# Patient Record
Sex: MALE | Race: WHITE | NOT HISPANIC OR LATINO | URBAN - METROPOLITAN AREA
[De-identification: names, ages, dates, MRNs, and addresses within clinical notes are randomized per-mention and may not be internally consistent; named-entity substitution may affect disease eponyms.]

---

## 2020-04-04 ENCOUNTER — EMERGENCY (EMERGENCY)
Facility: HOSPITAL | Age: 85
LOS: 1 days | Discharge: HOME | End: 2020-04-04
Attending: EMERGENCY MEDICINE
Payer: MEDICARE

## 2020-04-04 VITALS
RESPIRATION RATE: 18 BRPM | OXYGEN SATURATION: 99 % | SYSTOLIC BLOOD PRESSURE: 142 MMHG | HEART RATE: 57 BPM | DIASTOLIC BLOOD PRESSURE: 83 MMHG

## 2020-04-04 VITALS
HEIGHT: 62 IN | WEIGHT: 139.99 LBS | SYSTOLIC BLOOD PRESSURE: 161 MMHG | RESPIRATION RATE: 20 BRPM | OXYGEN SATURATION: 97 % | TEMPERATURE: 100 F | DIASTOLIC BLOOD PRESSURE: 77 MMHG | HEART RATE: 70 BPM

## 2020-04-04 LAB
ALBUMIN SERPL ELPH-MCNC: 3.9 G/DL — SIGNIFICANT CHANGE UP (ref 3.3–5)
ALP SERPL-CCNC: 124 U/L — HIGH (ref 40–120)
ALT FLD-CCNC: 39 U/L — SIGNIFICANT CHANGE UP (ref 10–45)
ANION GAP SERPL CALC-SCNC: 13 MMOL/L — SIGNIFICANT CHANGE UP (ref 5–17)
APPEARANCE UR: ABNORMAL
APTT BLD: 30.5 SEC — SIGNIFICANT CHANGE UP (ref 27.5–36.3)
AST SERPL-CCNC: 39 U/L — SIGNIFICANT CHANGE UP (ref 10–40)
BACTERIA # UR AUTO: ABNORMAL
BASOPHILS # BLD AUTO: 0.05 K/UL — SIGNIFICANT CHANGE UP (ref 0–0.2)
BASOPHILS NFR BLD AUTO: 0.5 % — SIGNIFICANT CHANGE UP (ref 0–2)
BILIRUB SERPL-MCNC: 0.9 MG/DL — SIGNIFICANT CHANGE UP (ref 0.2–1.2)
BILIRUB UR-MCNC: NEGATIVE — SIGNIFICANT CHANGE UP
BUN SERPL-MCNC: 22 MG/DL — SIGNIFICANT CHANGE UP (ref 7–23)
CALCIUM SERPL-MCNC: 9.5 MG/DL — SIGNIFICANT CHANGE UP (ref 8.4–10.5)
CHLORIDE SERPL-SCNC: 103 MMOL/L — SIGNIFICANT CHANGE UP (ref 96–108)
CO2 SERPL-SCNC: 23 MMOL/L — SIGNIFICANT CHANGE UP (ref 22–31)
COLOR SPEC: YELLOW — SIGNIFICANT CHANGE UP
CREAT SERPL-MCNC: 1.15 MG/DL — SIGNIFICANT CHANGE UP (ref 0.5–1.3)
DIFF PNL FLD: ABNORMAL
EOSINOPHIL # BLD AUTO: 0.09 K/UL — SIGNIFICANT CHANGE UP (ref 0–0.5)
EOSINOPHIL NFR BLD AUTO: 1 % — SIGNIFICANT CHANGE UP (ref 0–6)
EPI CELLS # UR: 1 /HPF — SIGNIFICANT CHANGE UP
GLUCOSE SERPL-MCNC: 99 MG/DL — SIGNIFICANT CHANGE UP (ref 70–99)
GLUCOSE UR QL: NEGATIVE — SIGNIFICANT CHANGE UP
HCT VFR BLD CALC: 45.7 % — SIGNIFICANT CHANGE UP (ref 39–50)
HGB BLD-MCNC: 14.3 G/DL — SIGNIFICANT CHANGE UP (ref 13–17)
HYALINE CASTS # UR AUTO: 1 /LPF — SIGNIFICANT CHANGE UP (ref 0–2)
IMM GRANULOCYTES NFR BLD AUTO: 0.5 % — SIGNIFICANT CHANGE UP (ref 0–1.5)
INR BLD: 1.12 RATIO — SIGNIFICANT CHANGE UP (ref 0.88–1.16)
KETONES UR-MCNC: NEGATIVE — SIGNIFICANT CHANGE UP
LEUKOCYTE ESTERASE UR-ACNC: ABNORMAL
LYMPHOCYTES # BLD AUTO: 1.25 K/UL — SIGNIFICANT CHANGE UP (ref 1–3.3)
LYMPHOCYTES # BLD AUTO: 13.4 % — SIGNIFICANT CHANGE UP (ref 13–44)
MCHC RBC-ENTMCNC: 27.8 PG — SIGNIFICANT CHANGE UP (ref 27–34)
MCHC RBC-ENTMCNC: 31.3 GM/DL — LOW (ref 32–36)
MCV RBC AUTO: 88.9 FL — SIGNIFICANT CHANGE UP (ref 80–100)
MONOCYTES # BLD AUTO: 1.05 K/UL — HIGH (ref 0–0.9)
MONOCYTES NFR BLD AUTO: 11.2 % — SIGNIFICANT CHANGE UP (ref 2–14)
NEUTROPHILS # BLD AUTO: 6.87 K/UL — SIGNIFICANT CHANGE UP (ref 1.8–7.4)
NEUTROPHILS NFR BLD AUTO: 73.4 % — SIGNIFICANT CHANGE UP (ref 43–77)
NITRITE UR-MCNC: NEGATIVE — SIGNIFICANT CHANGE UP
NRBC # BLD: 0 /100 WBCS — SIGNIFICANT CHANGE UP (ref 0–0)
PH UR: 6 — SIGNIFICANT CHANGE UP (ref 5–8)
PLATELET # BLD AUTO: 165 K/UL — SIGNIFICANT CHANGE UP (ref 150–400)
POTASSIUM SERPL-MCNC: 4.3 MMOL/L — SIGNIFICANT CHANGE UP (ref 3.5–5.3)
POTASSIUM SERPL-SCNC: 4.3 MMOL/L — SIGNIFICANT CHANGE UP (ref 3.5–5.3)
PROT SERPL-MCNC: 7.3 G/DL — SIGNIFICANT CHANGE UP (ref 6–8.3)
PROT UR-MCNC: ABNORMAL
PROTHROM AB SERPL-ACNC: 12.9 SEC — SIGNIFICANT CHANGE UP (ref 10–12.9)
RBC # BLD: 5.14 M/UL — SIGNIFICANT CHANGE UP (ref 4.2–5.8)
RBC # FLD: 14.6 % — HIGH (ref 10.3–14.5)
RBC CASTS # UR COMP ASSIST: 7 /HPF — HIGH (ref 0–4)
SODIUM SERPL-SCNC: 139 MMOL/L — SIGNIFICANT CHANGE UP (ref 135–145)
SP GR SPEC: 1.02 — SIGNIFICANT CHANGE UP (ref 1.01–1.02)
UROBILINOGEN FLD QL: ABNORMAL
WBC # BLD: 9.36 K/UL — SIGNIFICANT CHANGE UP (ref 3.8–10.5)
WBC # FLD AUTO: 9.36 K/UL — SIGNIFICANT CHANGE UP (ref 3.8–10.5)
WBC UR QL: 380 /HPF — HIGH (ref 0–5)

## 2020-04-04 PROCEDURE — 71045 X-RAY EXAM CHEST 1 VIEW: CPT

## 2020-04-04 PROCEDURE — 87086 URINE CULTURE/COLONY COUNT: CPT

## 2020-04-04 PROCEDURE — 71045 X-RAY EXAM CHEST 1 VIEW: CPT | Mod: 26

## 2020-04-04 PROCEDURE — 81001 URINALYSIS AUTO W/SCOPE: CPT

## 2020-04-04 PROCEDURE — 85730 THROMBOPLASTIN TIME PARTIAL: CPT

## 2020-04-04 PROCEDURE — 99284 EMERGENCY DEPT VISIT MOD MDM: CPT | Mod: 25

## 2020-04-04 PROCEDURE — 93010 ELECTROCARDIOGRAM REPORT: CPT

## 2020-04-04 PROCEDURE — 93005 ELECTROCARDIOGRAM TRACING: CPT

## 2020-04-04 PROCEDURE — 87186 SC STD MICRODIL/AGAR DIL: CPT

## 2020-04-04 PROCEDURE — 85610 PROTHROMBIN TIME: CPT

## 2020-04-04 PROCEDURE — 70450 CT HEAD/BRAIN W/O DYE: CPT | Mod: 26

## 2020-04-04 PROCEDURE — 99285 EMERGENCY DEPT VISIT HI MDM: CPT

## 2020-04-04 PROCEDURE — 80053 COMPREHEN METABOLIC PANEL: CPT

## 2020-04-04 PROCEDURE — 85027 COMPLETE CBC AUTOMATED: CPT

## 2020-04-04 PROCEDURE — 70450 CT HEAD/BRAIN W/O DYE: CPT

## 2020-04-04 RX ORDER — CEPHALEXIN 500 MG
1 CAPSULE ORAL
Qty: 14 | Refills: 0
Start: 2020-04-04 | End: 2020-04-10

## 2020-04-04 RX ORDER — ACETAMINOPHEN 500 MG
975 TABLET ORAL ONCE
Refills: 0 | Status: COMPLETED | OUTPATIENT
Start: 2020-04-04 | End: 2020-04-04

## 2020-04-04 RX ORDER — TETANUS TOXOID, REDUCED DIPHTHERIA TOXOID AND ACELLULAR PERTUSSIS VACCINE, ADSORBED 5; 2.5; 8; 8; 2.5 [IU]/.5ML; [IU]/.5ML; UG/.5ML; UG/.5ML; UG/.5ML
0.5 SUSPENSION INTRAMUSCULAR ONCE
Refills: 0 | Status: COMPLETED | OUTPATIENT
Start: 2020-04-04 | End: 2020-04-04

## 2020-04-04 RX ADMIN — Medication 975 MILLIGRAM(S): at 22:13

## 2020-04-04 NOTE — ED ADULT NURSE NOTE - OBJECTIVE STATEMENT
86M to ED s/p fall this morning. Pt states that he remembers what happened and did not black out. Pt states that he slipped on water and that his only injury is the small amount of dried blood above his Right eyebrow. Patient denies anticoagulant use. Patient is alert and oriented x4, calm/cooperative, NAD, VSS. Pt has 18 ga to RAC. Pt is ambulatory and stable on his feet as he walks back to the [facility] ED from triage. Pt denies SOB, BS are CTA, pt is on room air. Fever noted rectally and documented in flowhseets. See VS. Pt is put on cardiac monitor, NSR/Sinus Bradycardia.

## 2020-04-04 NOTE — ED PROVIDER NOTE - OBJECTIVE STATEMENT
86M hx hld presents ~10hrs post mechanical fall this AM while getting out of bed. Per pt at around 10am he stood up out of bed and lost his balance, falling forward and banging his forehead on the closet. His wife witnessed the incident. Pt denies experiencing LOC. Pt notes that lately every once in a while he's been feeling light-headed out of nowhere and he's not sure why - PMD told him to come in for further w/u. Pt denies room spinning sensation when it happens. Denies n/v since the incident and changes in vision. pt not on AC. During these episodes that occur he estimates once per month he never has chest pain, palpitations, or HA. Pt says he had a stress test several years ago that was normal. Does not regularly follow with a cardiologist.    PMARIES Ferrer MD

## 2020-04-04 NOTE — ED PROVIDER NOTE - PATIENT PORTAL LINK FT
You can access the FollowMyHealth Patient Portal offered by [facility] by registering at the following website: [email or web address]. By joining [employer]’s FollowMyHealth portal, you will also be able to view your health information using other applications (apps) compatible with our system.

## 2020-04-04 NOTE — ED ADULT TRIAGE NOTE - CHIEF COMPLAINT QUOTE
unwitnessed fall this morning, hit head, no anticoagulation medication   reports feeling dizzy since yesterday  pt. states "I slipped in water"

## 2020-04-04 NOTE — ED PROVIDER NOTE - SHIFT CHANGE DETAILS
Rubin Bell MD FACEP note of transfer at the usual time of sign out: Receiving team will follow up on labs, analgesia, any clinical imaging, reassess and disposition as clinically indicated.  Details of patient and plan conveyed to receiving physician and conveyed back for understanding.  There were no questions at this time about the patient's status, disposition, and plan. Patient's care to be taken over by receiving physician at this time, all decisions regarding the progression of care will be made at their discretion.

## 2020-04-04 NOTE — ED PROVIDER NOTE - ATTENDING CONTRIBUTION TO CARE
patient with mechanical fall, no chest pain no shortness of breath, no presyncopal symptoms and no loss of consciousness   GEN: NAD, awake, eyes open spontaneously  HEENT: abrasion over right eyebrow, well approximated, MMM, Trachea midline, normal conjunctiva, perrl  CHEST/LUNGS: Non-tachypneic, CTAB, bilateral breath sounds  CARDIAC: Non-tachycardic, normal perfusion  ABDOMEN: Soft, NTND, No rebound/guarding  MSK: No edema, no gross deformity of extremities, stable pelvis  SKIN: No rashes, no petechiae, no vesicles  NEURO: CN grossly intact, normal coordination, no focal motor or sensory deficits  PSYCH: Alert, appropriate, cooperative, with capacity and insight     patient with fever and fall this am   Rubin Bell MD, FACEP: In this physician's medical judgement based on clinical history and physical exam, fall and head injury will ct head, will get iv, cbc, cmp, for electrolytes ekg and cxr\  Will follow up on labs, analgesia, imaging, reassess and disposition as clinically indicated.   The patient and/or family has been educated on signs and the risks of subdural due to just falling and the swinging of the head back and forth as well as with contact and closed head injuries. Strict follow up instructions were given concerning subdural bleeding including but not limited to: confusion, nausea, vomiting, headache, mental status changes, difficulty walking, any concerns at all, etc..     This patient was seen during the time of the COVID-19 pandemic, the care of this patient was in support of the [facility] countermeasures to COVID-19. The patient was stable for discharge and the team advised the patient that admission for further work up poses increased risks to health that do not necessarily outweigh the possible benefits of further inpatient treatment. Patient given strict return precautions including but not limited to: persistent or worsening shortness of breath, chest pain, shortness of breath at rest, shortness of breath worsening on exertion, uncontrollable fevers, weakness, inability to eat or drink, or any concerns at all.  The patient and/or family were given the opportunity to ask questions and have them answered in full. The patient and/or family are with capacity and insight into the situation, treatment, risks, benefits, alternative therapies, and understand that they can ask any further questions if needed.     The patient was serially evaluated throughout emergency department course. There was no acute deterioration up to this time in the department. Patient has demonstrated clinical improvement and is stable, feels better at this time according to emergency department team. Agree with goals/plan of emergency department care as described in this physician's electronic medical record, including diagnostics, therapeutics and consultation as clinically warranted. Will discharge home with close outpatient follow up with primary care physician/provider and specialist if necessary. The patient and/or family was educated on concerning signs and features to return to the emergency department, in layman terms, including but not limited to: nausea, vomiting, fever, chills, persistent/worsening symptoms or any concerns at all. No immediate life threatening issues present on history, clinical exam, or any diagnostic evaluation. The patient is a safe disposition home, has capacity and insight into their condition, is ambulatory in the Emergency Department with no further questions and will follow up with their doctor(s) this week. The patient and/or family were given the opportunity to ask questions and have them answered in full. The patient and/or family are with capacity and insight into the situation, treatment, risks, benefits, alternative therapies, and understand that they can ask any further questions if needed. Patient and/or family/guardian understands anticipatory guidance and was given strict return and follow up precautions. The patient and/or family/guardian has been informed, in layman terms, of all concerning signs and symptoms to return to Emergency Department, the necessity to follow up with the PMD/Clinic/follow up provided within 2-3 days was explained, and the patient and/or family/guardian reports understanding of above with capacity and insight. The patient and/or family/guardain were informed of any results of their tests and are were encouraged to follow up on the findings with their doctor as well as the need to inform their doctor of any results. The patient and/or family/guardian are aware of the need to follow up with repeat testing as applicable and report understanding of the above with capacity and insight. The patient and/or family/guardian was made aware of any pending test results at the time of discharge and of the need to call back for the final results a well as the need to inform their doctor of the results.

## 2020-04-04 NOTE — ED PROVIDER NOTE - CLINICAL SUMMARY MEDICAL DECISION MAKING FREE TEXT BOX
86M not on AC presens ~10 hrs s/p mechnical fall. Small cut noted above R orbit without need for repair. PERRLA. Pt neuro intact and walks straight line without symptoms or noticeable ataxia. Will check labs, ct head, coags, ekg. Pt likely needs echo - will engage in shared decision making about where he has that done.

## 2020-04-04 NOTE — ED PROVIDER NOTE - NSFOLLOWUPINSTRUCTIONS_ED_ALL_ED_FT
1) Please follow-up with your primary care doctor.  If you cannot follow-up with your doctor(s), please return to the ED for any urgent issues.  2) If you have any worsening of symptoms or any other concerns please return to the ED immediately.  3) Please continue taking your home medications as directed.  4) You may have been given a copy of your labs and/or imaging.  Please go over these with your primary care doctor.   5) A prescription has been sent to your pharmacy. Please take it as directed.  6) Take 600mg Motrin (also called Advil or Ibuprofen) every 6 hours as needed for fever/pain/discomfort/swelling. You can take this with Tylenol 650 mg (also called acetaminophen) every 6 hours.   Don't use more than 3500mg of Tylenol in any 24-hour period. Make sure your other prescription/over-the-counter medications don't contain any Tylenol so you don't take too much.   If you have any stomach discomfort while taking Motrin, you can use TUMS or Pepcid or Zantac (these can all be bought without a prescription).

## 2020-04-06 NOTE — ED POST DISCHARGE NOTE - ADDITIONAL DOCUMENTATION
4/7/2020: UCx sensitive to 1st gen cephalosporins no further intervention required -Kathleen Barrera PA-C 4/7/2020: UCx >100,000 ecoli sensitive to 1st gen cephalosporins no further intervention required -Kathleen Barrera PA-C

## 2020-05-07 PROBLEM — Z00.00 ENCOUNTER FOR PREVENTIVE HEALTH EXAMINATION: Status: ACTIVE | Noted: 2020-05-07

## 2022-01-17 ENCOUNTER — INPATIENT (INPATIENT)
Facility: HOSPITAL | Age: 87
LOS: 2 days | Discharge: HOME | DRG: 690 | End: 2022-01-20
Attending: INTERNAL MEDICINE | Admitting: INTERNAL MEDICINE
Payer: MEDICARE

## 2022-01-17 VITALS
HEIGHT: 62 IN | RESPIRATION RATE: 20 BRPM | DIASTOLIC BLOOD PRESSURE: 63 MMHG | WEIGHT: 139.99 LBS | SYSTOLIC BLOOD PRESSURE: 108 MMHG | OXYGEN SATURATION: 97 % | TEMPERATURE: 98 F | HEART RATE: 63 BPM

## 2022-01-17 DIAGNOSIS — R26.81 UNSTEADINESS ON FEET: ICD-10-CM

## 2022-01-17 LAB
ALBUMIN SERPL ELPH-MCNC: 4.1 G/DL — SIGNIFICANT CHANGE UP (ref 3.3–5)
ALP SERPL-CCNC: 121 U/L — HIGH (ref 40–120)
ALT FLD-CCNC: 54 U/L — HIGH (ref 10–45)
ANION GAP SERPL CALC-SCNC: 13 MMOL/L — SIGNIFICANT CHANGE UP (ref 5–17)
APPEARANCE UR: ABNORMAL
AST SERPL-CCNC: 57 U/L — HIGH (ref 10–40)
BACTERIA # UR AUTO: ABNORMAL
BASOPHILS # BLD AUTO: 0.05 K/UL — SIGNIFICANT CHANGE UP (ref 0–0.2)
BASOPHILS NFR BLD AUTO: 0.4 % — SIGNIFICANT CHANGE UP (ref 0–2)
BILIRUB SERPL-MCNC: 2.8 MG/DL — HIGH (ref 0.2–1.2)
BILIRUB UR-MCNC: NEGATIVE — SIGNIFICANT CHANGE UP
BUN SERPL-MCNC: 26 MG/DL — HIGH (ref 7–23)
CALCIUM SERPL-MCNC: 9.4 MG/DL — SIGNIFICANT CHANGE UP (ref 8.4–10.5)
CHLORIDE SERPL-SCNC: 105 MMOL/L — SIGNIFICANT CHANGE UP (ref 96–108)
CO2 SERPL-SCNC: 18 MMOL/L — LOW (ref 22–31)
COLOR SPEC: SIGNIFICANT CHANGE UP
CREAT SERPL-MCNC: 1.15 MG/DL — SIGNIFICANT CHANGE UP (ref 0.5–1.3)
DIFF PNL FLD: ABNORMAL
EOSINOPHIL # BLD AUTO: 0.05 K/UL — SIGNIFICANT CHANGE UP (ref 0–0.5)
EOSINOPHIL NFR BLD AUTO: 0.4 % — SIGNIFICANT CHANGE UP (ref 0–6)
EPI CELLS # UR: 0 /HPF — SIGNIFICANT CHANGE UP
GLUCOSE SERPL-MCNC: 125 MG/DL — HIGH (ref 70–99)
GLUCOSE UR QL: NEGATIVE — SIGNIFICANT CHANGE UP
HCT VFR BLD CALC: 44.4 % — SIGNIFICANT CHANGE UP (ref 39–50)
HGB BLD-MCNC: 14.3 G/DL — SIGNIFICANT CHANGE UP (ref 13–17)
HYALINE CASTS # UR AUTO: 2 /LPF — SIGNIFICANT CHANGE UP (ref 0–7)
IMM GRANULOCYTES NFR BLD AUTO: 0.9 % — SIGNIFICANT CHANGE UP (ref 0–1.5)
KETONES UR-MCNC: NEGATIVE — SIGNIFICANT CHANGE UP
LEUKOCYTE ESTERASE UR-ACNC: ABNORMAL
LYMPHOCYTES # BLD AUTO: 1.02 K/UL — SIGNIFICANT CHANGE UP (ref 1–3.3)
LYMPHOCYTES # BLD AUTO: 8.6 % — LOW (ref 13–44)
MCHC RBC-ENTMCNC: 28 PG — SIGNIFICANT CHANGE UP (ref 27–34)
MCHC RBC-ENTMCNC: 32.2 GM/DL — SIGNIFICANT CHANGE UP (ref 32–36)
MCV RBC AUTO: 87.1 FL — SIGNIFICANT CHANGE UP (ref 80–100)
MONOCYTES # BLD AUTO: 1.09 K/UL — HIGH (ref 0–0.9)
MONOCYTES NFR BLD AUTO: 9.2 % — SIGNIFICANT CHANGE UP (ref 2–14)
NEUTROPHILS # BLD AUTO: 9.52 K/UL — HIGH (ref 1.8–7.4)
NEUTROPHILS NFR BLD AUTO: 80.5 % — HIGH (ref 43–77)
NITRITE UR-MCNC: NEGATIVE — SIGNIFICANT CHANGE UP
NRBC # BLD: 0 /100 WBCS — SIGNIFICANT CHANGE UP (ref 0–0)
PH UR: 6 — SIGNIFICANT CHANGE UP (ref 5–8)
PLATELET # BLD AUTO: 124 K/UL — LOW (ref 150–400)
POTASSIUM SERPL-MCNC: 4.1 MMOL/L — SIGNIFICANT CHANGE UP (ref 3.5–5.3)
POTASSIUM SERPL-SCNC: 4.1 MMOL/L — SIGNIFICANT CHANGE UP (ref 3.5–5.3)
PROT SERPL-MCNC: 6.9 G/DL — SIGNIFICANT CHANGE UP (ref 6–8.3)
PROT UR-MCNC: 100 — SIGNIFICANT CHANGE UP
RBC # BLD: 5.1 M/UL — SIGNIFICANT CHANGE UP (ref 4.2–5.8)
RBC # FLD: 14.7 % — HIGH (ref 10.3–14.5)
RBC CASTS # UR COMP ASSIST: >50 /HPF — HIGH (ref 0–4)
SODIUM SERPL-SCNC: 136 MMOL/L — SIGNIFICANT CHANGE UP (ref 135–145)
SP GR SPEC: 1.05 — HIGH (ref 1.01–1.02)
TROPONIN T, HIGH SENSITIVITY RESULT: 21 NG/L — SIGNIFICANT CHANGE UP (ref 0–51)
UROBILINOGEN FLD QL: NEGATIVE — SIGNIFICANT CHANGE UP
WBC # BLD: 11.84 K/UL — HIGH (ref 3.8–10.5)
WBC # FLD AUTO: 11.84 K/UL — HIGH (ref 3.8–10.5)
WBC UR QL: >50 /HPF — HIGH (ref 0–5)

## 2022-01-17 PROCEDURE — 74177 CT ABD & PELVIS W/CONTRAST: CPT | Mod: 26

## 2022-01-17 PROCEDURE — 76705 ECHO EXAM OF ABDOMEN: CPT | Mod: 26

## 2022-01-17 PROCEDURE — 70496 CT ANGIOGRAPHY HEAD: CPT | Mod: 26,MA

## 2022-01-17 PROCEDURE — 70498 CT ANGIOGRAPHY NECK: CPT | Mod: 26,MA

## 2022-01-17 PROCEDURE — 99285 EMERGENCY DEPT VISIT HI MDM: CPT

## 2022-01-17 PROCEDURE — 71045 X-RAY EXAM CHEST 1 VIEW: CPT | Mod: 26

## 2022-01-17 RX ORDER — SODIUM CHLORIDE 9 MG/ML
1000 INJECTION, SOLUTION INTRAVENOUS
Refills: 0 | Status: DISCONTINUED | OUTPATIENT
Start: 2022-01-17 | End: 2022-01-18

## 2022-01-17 RX ORDER — HEPARIN SODIUM 5000 [USP'U]/ML
5000 INJECTION INTRAVENOUS; SUBCUTANEOUS EVERY 12 HOURS
Refills: 0 | Status: DISCONTINUED | OUTPATIENT
Start: 2022-01-17 | End: 2022-01-20

## 2022-01-17 RX ORDER — CEFTRIAXONE 500 MG/1
1000 INJECTION, POWDER, FOR SOLUTION INTRAMUSCULAR; INTRAVENOUS ONCE
Refills: 0 | Status: COMPLETED | OUTPATIENT
Start: 2022-01-17 | End: 2022-01-17

## 2022-01-17 RX ORDER — ACETAMINOPHEN 500 MG
650 TABLET ORAL ONCE
Refills: 0 | Status: COMPLETED | OUTPATIENT
Start: 2022-01-17 | End: 2022-01-17

## 2022-01-17 RX ORDER — OXYBUTYNIN CHLORIDE 5 MG
1 TABLET ORAL
Qty: 0 | Refills: 0 | DISCHARGE

## 2022-01-17 RX ORDER — SIMVASTATIN 20 MG/1
1 TABLET, FILM COATED ORAL
Qty: 0 | Refills: 0 | DISCHARGE

## 2022-01-17 RX ORDER — ASPIRIN/CALCIUM CARB/MAGNESIUM 324 MG
81 TABLET ORAL DAILY
Refills: 0 | Status: DISCONTINUED | OUTPATIENT
Start: 2022-01-17 | End: 2022-01-20

## 2022-01-17 RX ORDER — CEFTRIAXONE 500 MG/1
1000 INJECTION, POWDER, FOR SOLUTION INTRAMUSCULAR; INTRAVENOUS EVERY 24 HOURS
Refills: 0 | Status: DISCONTINUED | OUTPATIENT
Start: 2022-01-17 | End: 2022-01-20

## 2022-01-17 RX ORDER — SODIUM CHLORIDE 9 MG/ML
1000 INJECTION, SOLUTION INTRAVENOUS ONCE
Refills: 0 | Status: COMPLETED | OUTPATIENT
Start: 2022-01-17 | End: 2022-01-17

## 2022-01-17 RX ORDER — DORZOLAMIDE HYDROCHLORIDE TIMOLOL MALEATE 20; 5 MG/ML; MG/ML
1 SOLUTION/ DROPS OPHTHALMIC
Qty: 0 | Refills: 0 | DISCHARGE

## 2022-01-17 RX ADMIN — Medication 650 MILLIGRAM(S): at 19:10

## 2022-01-17 RX ADMIN — CEFTRIAXONE 100 MILLIGRAM(S): 500 INJECTION, POWDER, FOR SOLUTION INTRAMUSCULAR; INTRAVENOUS at 19:11

## 2022-01-17 RX ADMIN — SODIUM CHLORIDE 1000 MILLILITER(S): 9 INJECTION, SOLUTION INTRAVENOUS at 19:58

## 2022-01-17 NOTE — ED PROVIDER NOTE - CLINICAL SUMMARY MEDICAL DECISION MAKING FREE TEXT BOX
88M with PMHx of HTN and HLD p/w feeling unsteady when walking and a fall. Pt does not have nystagmus and denies feeling like the room is spinning so it is unlikely he has a posterior stroke. Pt also is out of the timeframe for intervention. Will obtain a cardiac workup and imaging and if continuing to have difficulty walking; may need an admission for PT and rehab.

## 2022-01-17 NOTE — ED ADULT NURSE NOTE - OBJECTIVE STATEMENT
89 yo male with a PMH of HTN presents to the ED complaining of dizziness. Primarily [place] speaking ([place] [id number]). States that he has been feeling dizzy over the past two days, worsening yesterday around 1500. Patient denies room spinning, states "it's me that's spinning, I feel so dizzy and off balance." States he fell 3x today due to the dizziness, L arm abrasion noted. Code stroke initiated at 1344, as per MD Pickett and stroke team--code stroke was cancelled @ 1350. Patient is speaking in full coherent sentences, BYNUM. No facial droop noted. Otherwise appears well at this time, denies dizziness at this time. Denies headache, vision changes, chest pain, shortness of breath, abdominal pain, nausea, vomiting, diarrhea, fevers, chills, dysuria, hematuria, recent illness travel. 87 yo male with a PMH of HTN presents to the ED complaining of dizziness. Primarily Danish speaking ([place] [id number]). States that he has been feeling dizzy over the past two days, worsening yesterday around 1500. Patient denies room spinning, states "it's me that's spinning, I feel so dizzy and off balance." States he fell 3x today due to the dizziness, L arm abrasion noted. Code stroke initiated at 1344, as per MD Pickett and stroke team--code stroke was cancelled @ 1350. Patient is speaking in full coherent sentences, BYNUM. Otherwise appears well at this time, denies dizziness at this time. Denies headache, vision changes, chest pain, shortness of breath, abdominal pain, nausea, vomiting, diarrhea, fevers, chills, dysuria, hematuria, recent illness travel.

## 2022-01-17 NOTE — ED PROVIDER NOTE - CARE PLAN
1 Principal Discharge DX:	Generally unsteady   Principal Discharge DX:	Generally unsteady  Secondary Diagnosis:	Acute UTI

## 2022-01-17 NOTE — ED PROVIDER NOTE - PROGRESS NOTE DETAILS
received sign out from Dr Pickett pending admission. 88yr M hx of htn hl p/w dizziness, work up as stroke alert. imaging neg, however concern for repeated falls. DJ Salbador, PGY3: pt with low grade rectal temp, urine positive, cultures grew ecoli sn to cephalosporins, low supsicion GB etiology (pt reassessed, no abd pain/n/v, no RUQ tenderness, no jaundice) will cover for uti. Will give liter of fluids and antipyretics. Pt stable for admission

## 2022-01-17 NOTE — ED ADULT NURSE REASSESSMENT NOTE - NS ED NURSE REASSESS COMMENT FT1
MD aware of HTN. Pt denies any hx of HTN. Denies CP, headache, dizziness, N/V. Pt appears well, in no current distress. Remains NSR on the cardiac monitor. Safety and comfort measures provided, bed locked and in lowest position, side rails up for safety. Awaiting CT

## 2022-01-17 NOTE — CONSULT NOTE ADULT - SUBJECTIVE AND OBJECTIVE BOX
CHIEF COMPLAINT:Patient is a 88y old  Male who presents with a chief complaint of dizziness/ fall (17 Jan 2022 19:41)      HPI:  87yo M with PMHx of HTN, HLD pt has hx HLD, macular degen (monthly injections), stated ,unsteadiness over the past day, 2 falls with no headstrike or LOC, no recent fevers/chills, uri sxs, cough, abd pain  presenting with dizziness. He endorses that since yesterday at roughly 3pm he has been feeling unsteady on his feet and as if he will fall when he stands.    Denies any room spinning sensations or recent illnesses such as fevers, cough, rashes, etc  . Fallen earlier today on the street and hit his left side  . No difficulty ambulating afterwards and no head trauma. No blood thinner use.        PAST MEDICAL & SURGICAL HISTORY:  HTN (hypertension)    HLD (hyperlipidemia)        MEDICATIONS  (STANDING):  lactated ringers Bolus 1000 milliLiter(s) IV Bolus once    MEDICATIONS  (PRN):      FAMILY HISTORY:      SOCIAL HISTORY:    [x ] Non-smoker  [ ] Smoker  [ ] Alcohol    Allergies    No Known Allergies    Intolerances    	    REVIEW OF SYSTEMS:  CONSTITUTIONAL: No fever, weight loss, or fatigue  EYES: No eye pain, visual disturbances, or discharge  ENT:  No difficulty hearing, tinnitus, vertigo; No sinus or throat pain  NECK: No pain or stiffness  RESPIRATORY: No cough, wheezing, chills or hemoptysis; No Shortness of Breath  CARDIOVASCULAR: No chest pain, palpitations, passing out, dizziness, or leg swelling  GASTROINTESTINAL: No abdominal or epigastric pain. No nausea, vomiting, or hematemesis; No diarrhea or constipation. No melena or hematochezia.  GENITOURINARY: No dysuria, frequency, hematuria, or incontinence  NEUROLOGICAL: No headaches, memory loss, loss of strength, numbness, or tremors  SKIN: No itching, burning, rashes, or lesions   LYMPH Nodes: No enlarged glands  ENDOCRINE: No heat or cold intolerance; No hair loss  MUSCULOSKELETAL: No joint pain or swelling; No muscle, back, or extremity pain  PSYCHIATRIC: No depression, anxiety, mood swings, or difficulty sleeping  HEME/LYMPH: No easy bruising, or bleeding gums  ALLERGY AND IMMUNOLOGIC: No hives or eczema	    [ ] All others negative	  [ ] Unable to obtain    PHYSICAL EXAM:  T(C): 38 (01-17-22 @ 18:12), Max: 38 (01-17-22 @ 18:12)  HR: 78 (01-17-22 @ 18:12) (63 - 78)  BP: 112/66 (01-17-22 @ 18:12) (108/63 - 208/92)  RR: 18 (01-17-22 @ 18:12) (18 - 20)  SpO2: 99% (01-17-22 @ 18:12) (97% - 99%)  Wt(kg): --  I&O's Summary      Appearance: Normal	  HEENT:   Normal oral mucosa, PERRL, EOMI	  Lymphatic: No lymphadenopathy  Cardiovascular: Normal S1 S2, No JVD, + murmurs, No edema  Respiratory: Lungs clear to auscultation	  Psychiatry: A & O x 3, Mood & affect appropriate  Gastrointestinal:  Soft, Non-tender, + BS	  Skin: No rashes, No ecchymoses, No cyanosis	  Neurologic: Non-focal  Extremities: Normal range of motion, No clubbing, cyanosis or edema  Vascular: Peripheral pulses palpable 2+ bilaterally    TELEMETRY: 	    ECG:  	  RADIOLOGY:  OTHER: 	  	  LABS:	 	    CARDIAC MARKERS:                              14.3   11.84 )-----------( 124      ( 17 Jan 2022 14:08 )             44.4     01-17    136  |  105  |  26<H>  ----------------------------<  125<H>  4.1   |  18<L>  |  1.15    Ca    9.4      17 Jan 2022 14:08    TPro  6.9  /  Alb  4.1  /  TBili  2.8<H>  /  DBili  0.5<H>  /  AST  57<H>  /  ALT  54<H>  /  AlkPhos  121<H>  01-17    proBNP:   Lipid Profile:   HgA1c:   TSH:       PREVIOUS DIAGNOSTIC TESTING:    < from: 12 Lead ECG (04.04.20 @ 20:19) >  Diagnosis Line NORMAL SINUS RHYTHM WITH SINUS ARRHYTHMIA  POSSIBLE ANTERIOR INFARCT , AGE UNDETERMINED  ABNORMAL ECG    < from: CT Angio Neck w/ IV Cont (01.17.22 @ 17:26) >  CT brain: No acute intracranial hemorrhage, mass effect, midline shift.   If clinical symptoms persist, may consider MRI for further evaluation.    CTA neck and brain: Mild stenosis at the carotid bifurcations   bilaterally. Otherwise, no vessel occlusion or significant stenosis.  < from: Xray Chest 1 View- PORTABLE-Urgent (Xray Chest 1 View- PORTABLE-Urgent .) (01.17.22 @ 15:29) >  INTERPRETATION:  EXAMINATION: XR CHEST URGENT    CLINICAL INDICATION: fatigue    TECHNIQUE: Single frontal, portable view of the chest was obtained.    COMPARISON: Chest radiograph 4/4/2020.    FINDINGS:  The heart size is normal.  The lungs are clear.  No pleural effusion or pneumothorax.    IMPRESSION:  Clear lungs.      Urinalysis + Microscopic Examination (01.17.22 @ 17:50)    Nitrite: Negative    Ketone - Urine: Negative    Color: Light Yellow    Glucose Qualitative, Urine: Negative    Blood, Urine: Large    Urine Appearance: Slightly Turbid    Urobilinogen: Negative    Specific Gravity: 1.050    Protein, Urine: 100    pH Urine: 6.0    Leukocyte Esterase Concentration: Large    Bilirubin: Negative    Red Blood Cell - Urine: >50 /hpf    White Blood Cell - Urine: >50 /HPF    Epithelial Cells: 0 /hpf    Hyaline Casts: 2 /LPF    Bacteria: Moderate

## 2022-01-17 NOTE — H&P ADULT - HISTORY OF PRESENT ILLNESS
87yo M     with PMHx of HTN, HLD    pt has hx HLD, macular degen (monthly injections), stated ,unsteadiness over the past day, 2 falls with no headstrike or LOC, no recent fevers/chills, uri sxs, cough, abd pain      presenting with dizziness. He endorses that since yesterday at roughly 3pm he has been feeling unsteady on his feet and as if he will fall when he stands.    Denies any room spinning sensations or recent illnesses such as fevers, cough, rashes, etc  . Fallen earlier today on the street and hit his left side  . No difficulty ambulating afterwards and no head trauma. No blood thinner use.    	 pts daughter Mary [phone],   pt has hx HLD, macular degen (monthly injections), stated ,unsteadiness over the past day, 2 falls with no headstrike or LOC, no recent fevers/chills, uri sxs, cough, abd pain  	PCP: Dr. Ole Ferrer

## 2022-01-17 NOTE — H&P ADULT - ASSESSMENT
9yo M     with PMHx of HTN, HLD    pt has hx HLD, macular degen (monthly injections), stated ,unsteadiness over the past day, 2 falls with no headstrike or LOC, no recent fevers/chills, uri sxs, cough, abd pain      presenting with dizziness. He endorses that since yesterday at roughly 3pm he has been feeling unsteady on his feet and as if he will fall when he stands.    Denies any room spinning sensations or recent illnesses such as fevers, cough, rashes, etc  . Fallen earlier today on the street and hit his left side  . No difficulty ambulating afterwards and no head trauma. No blood thinner use.    	 pts daughter Mary [phone],   pt has hx HLD, macular degen (monthly injections), stated ,unsteadiness over the past day, 2 falls with no headstrike or LOC, no recent fevers/chills, uri sxs, cough, abd pain  	PCP: Dr. Ole Ferrer       * dizziness/ with 2 falls    orthostatics   PT eval    echo. card eval   ct   head, no infarct   *  febrile  in er,  wbc is  11,000 on arrival   follow blood/ urine   c/s  cxr , normal   abnormal u/a/ uti   on iv rocephin  *  elevated lfts    abd u.s  on dvt pxx       ra< from: CT Head No Cont (01.17.22 @ 17:25) >  IMPRESSION:  CT brain: No acute intracranial hemorrhage, mass effect, midline shift.   If clinical symptoms persist, may consider MRI for further evaluation.  CTA neck and brain: Mild stenosis at the carotid bifurcations   bilaterally. Otherwise, no vessel occlusion or significant stenosis.  --- End of Report ---  < end of copied text >

## 2022-01-17 NOTE — ED PROVIDER NOTE - ATTENDING CONTRIBUTION TO CARE
Attending MD Pickett:  I personally have seen and examined this patient. I have performed a substantive portion of the visit including all aspects of the medical decision making.  Resident note reviewed and agree on plan of care and except where noted.  See HPI, PE, and MDM for details.        88M presenting with 2-3 days of nonspecific dizziness, gait instability. Code stroke was activated on arrival, however given time course of symptoms outside of consideration of IV tPA or endovascular intervention, code stroke was cancelled. Ddx broad for somewhat non specific symptoms but consider metabolic derangements, orthostasis, CVA. Plan for labs, ECG, CT head, CTA head and neck

## 2022-01-17 NOTE — ED ADULT NURSE REASSESSMENT NOTE - NS ED NURSE REASSESS COMMENT FT1
Report received from break coverage RN Bandar. Upon reassessment pt resting comfortably on stretcher in no distress. Pt appears well. Safety and comfort measures provided, bed locked and in lowest position, side rails up for safety. Call bell within reach. Awaiting all results.

## 2022-01-17 NOTE — H&P ADULT - NSHPLABSRESULTS_GEN_ALL_CORE
LABS:                        14.3   11.84 )-----------( 124      ( 2022 14:08 )             44.4         136  |  105  |  26<H>  ----------------------------<  125<H>  4.1   |  18<L>  |  1.15    Ca    9.4      2022 14:08    TPro  6.9  /  Alb  4.1  /  TBili  2.8<H>  /  DBili  0.5<H>  /  AST  57<H>  /  ALT  54<H>  /  AlkPhos  121<H>            Urinalysis Basic - ( 2022 17:50 )    Color: Light Yellow / Appearance: Slightly Turbid / S.050 / pH: x  Gluc: x / Ketone: Negative  / Bili: Negative / Urobili: Negative   Blood: x / Protein: 100 / Nitrite: Negative   Leuk Esterase: Large / RBC: >50 /hpf / WBC >50 /HPF   Sq Epi: x / Non Sq Epi: 0 /hpf / Bacteria: Moderate

## 2022-01-17 NOTE — CONSULT NOTE ADULT - ASSESSMENT
89yo M with PMHx of HTN, HLD pt has hx HLD, macular degen (monthly injections), stated ,unsteadiness over the past day, 2 falls with no headstrike or LOC, no recent fevers/chills, uri sxs, cough, abd pain  presenting with dizziness. He endorses that since yesterday at roughly 3pm he has been feeling unsteady on his feet and as if he will fall when he stands.    Denies any room spinning sensations or recent illnesses such as fevers, cough, rashes, etc  . Fallen earlier today on the street and hit his left side  . No difficulty ambulating afterwards and no head trauma. No blood thinner use. pt with sig cardiac risk factor for CAD, with dizziness.  ct angio head was noted recommending ?brain MRI  check orthostatic  asa  daily  physical therapy  temp , +UA ?uti start on abx  gentle hydration  echo  dvt prophylaxis

## 2022-01-17 NOTE — ED PROVIDER NOTE - OBJECTIVE STATEMENT
87yo M with PMHx of HTN, HLD presenting with dizziness. He endorses that since yesterday at roughly 3pm he has been feeling unsteady on his feet and as if he will fall when he stands. Denies any room spinning sensations or recent illnesses such as fevers, cough, rashes, etc. Fallen earlier today on the street and hit his left side. No difficulty ambulating afterwards and no head trauma. No blood thinner use. 89yo M with PMHx of HTN, HLD presenting with dizziness. He endorses that since yesterday at roughly 3pm he has been feeling unsteady on his feet and as if he will fall when he stands. Denies any room spinning sensations or recent illnesses such as fevers, cough, rashes, etc. Fallen earlier today on the street and hit his left side. No difficulty ambulating afterwards and no head trauma. No blood thinner use.    Salbador, PGY3: spoke with pts daughter Mary [phone], pt has hx HLD, macular degen (monthly injections), presents with unsteadiness over the past day, 2 falls with no headstrike or LOC, no recent fevers/chills, uri sxs, cough, abd pain, v/d, dark/bloody stools. daughter states last time something like this occurred pt was dx with uti. Pt walks without assistance at baseline and is functional with ADL's.       Meds: Oxybutinin, simvastatin, eyedrops 89yo M with PMHx of HTN, HLD presenting with dizziness. He endorses that since yesterday at roughly 3pm he has been feeling unsteady on his feet and as if he will fall when he stands. Denies any room spinning sensations or recent illnesses such as fevers, cough, rashes, etc. Fallen earlier today on the street and hit his left side. No difficulty ambulating afterwards and no head trauma. No blood thinner use.    Salbador, PGY3: spoke with pts daughter Mary [phone], pt has hx HLD, macular degen (monthly injections), presents with unsteadiness over the past day, 2 falls with no headstrike or LOC, no recent fevers/chills, uri sxs, cough, abd pain, v/d, dark/bloody stools. daughter states last time something like this occurred pt was dx with uti. Pt walks without assistance at baseline and is functional with ADL's.   PCP: Dr. Ole Ferrer    Meds: Oxybutinin, simvastatin, eyedrops

## 2022-01-17 NOTE — H&P ADULT - NSHPREVIEWOFSYSTEMS_GEN_ALL_CORE
REVIEW OF SYSTEMS:  GEN: no fever,    no chills  RESP: no SOB,   no cough  CVS: no chest pain,   no palpitations  GI: no abdominal pain,   no nausea,   no vomiting,   no constipation,   no diarrhea  : no dysuria,   no frequency  NEURO: no headache,    dizziness  PSYCH: no depression,   not anxious  Derm : no rash

## 2022-01-17 NOTE — H&P ADULT - NSHPPHYSICALEXAM_GEN_ALL_CORE
PHYSICAL EXAMINATION:  Vital Signs Last 24 Hrs  T(C): 38 (17 Jan 2022 18:12), Max: 38 (17 Jan 2022 18:12)  T(F): 100.4 (17 Jan 2022 18:12), Max: 100.4 (17 Jan 2022 18:12)  HR: 78 (17 Jan 2022 18:12) (63 - 78)  BP: 112/66 (17 Jan 2022 18:12) (108/63 - 208/92)  BP(mean): --  RR: 18 (17 Jan 2022 18:12) (18 - 20)  SpO2: 99% (17 Jan 2022 18:12) (97% - 99%)  CAPILLARY BLOOD GLUCOSE  108 (17 Jan 2022 14:28)      POCT Blood Glucose.: 108 mg/dL (17 Jan 2022 13:37)        GENERAL: NAD, well-groomed,  HEAD:  atraumatic, normocephalic  EYES: sclera anicteric  ENMT: mucous membranes moist  NECK: supple, No JVD  CHEST/LUNG: clear to auscultation bilaterally;    no      rales   ,   no rhonchi,   HEART: normal S1, S2  ABDOMEN: BS+, soft, ND, NT   EXTREMITIES:    no    edema    b/l LEs  NEURO: awake, ,     moves all extremities  SKIN: no     rash

## 2022-01-17 NOTE — ED ADULT TRIAGE NOTE - CHIEF COMPLAINT QUOTE
pt feels off balance when attempting to walk had similar episode yesterday afternoon in triage on facial right eyebrow not able to raise slight r facial droop pt fell x 3 today

## 2022-01-17 NOTE — ED ADULT NURSE REASSESSMENT NOTE - NS ED NURSE REASSESS COMMENT FT1
pt resting comfortably on stretcher in no distress. Safety and comfort measures provided, bed locked and in lowest position, side rails up for safety. Awaiting admitting bed placement

## 2022-01-18 LAB
ALBUMIN SERPL ELPH-MCNC: 3.2 G/DL — LOW (ref 3.3–5)
ALP SERPL-CCNC: 110 U/L — SIGNIFICANT CHANGE UP (ref 40–120)
ALT FLD-CCNC: 43 U/L — SIGNIFICANT CHANGE UP (ref 10–45)
ANION GAP SERPL CALC-SCNC: 12 MMOL/L — SIGNIFICANT CHANGE UP (ref 5–17)
AST SERPL-CCNC: 59 U/L — HIGH (ref 10–40)
BILIRUB DIRECT SERPL-MCNC: 0.4 MG/DL — HIGH (ref 0–0.3)
BILIRUB INDIRECT FLD-MCNC: 1.5 MG/DL — HIGH (ref 0.2–1)
BILIRUB SERPL-MCNC: 1.9 MG/DL — HIGH (ref 0.2–1.2)
BUN SERPL-MCNC: 20 MG/DL — SIGNIFICANT CHANGE UP (ref 7–23)
CALCIUM SERPL-MCNC: 8.8 MG/DL — SIGNIFICANT CHANGE UP (ref 8.4–10.5)
CHLORIDE SERPL-SCNC: 105 MMOL/L — SIGNIFICANT CHANGE UP (ref 96–108)
CO2 SERPL-SCNC: 19 MMOL/L — LOW (ref 22–31)
CREAT SERPL-MCNC: 0.93 MG/DL — SIGNIFICANT CHANGE UP (ref 0.5–1.3)
GLUCOSE SERPL-MCNC: 86 MG/DL — SIGNIFICANT CHANGE UP (ref 70–99)
HCT VFR BLD CALC: 40.1 % — SIGNIFICANT CHANGE UP (ref 39–50)
HGB BLD-MCNC: 12.9 G/DL — LOW (ref 13–17)
MCHC RBC-ENTMCNC: 28.2 PG — SIGNIFICANT CHANGE UP (ref 27–34)
MCHC RBC-ENTMCNC: 32.2 GM/DL — SIGNIFICANT CHANGE UP (ref 32–36)
MCV RBC AUTO: 87.7 FL — SIGNIFICANT CHANGE UP (ref 80–100)
NRBC # BLD: 0 /100 WBCS — SIGNIFICANT CHANGE UP (ref 0–0)
PLATELET # BLD AUTO: 106 K/UL — LOW (ref 150–400)
POTASSIUM SERPL-MCNC: 3.8 MMOL/L — SIGNIFICANT CHANGE UP (ref 3.5–5.3)
POTASSIUM SERPL-SCNC: 3.8 MMOL/L — SIGNIFICANT CHANGE UP (ref 3.5–5.3)
PROT SERPL-MCNC: 5.9 G/DL — LOW (ref 6–8.3)
RBC # BLD: 4.57 M/UL — SIGNIFICANT CHANGE UP (ref 4.2–5.8)
RBC # FLD: 14.8 % — HIGH (ref 10.3–14.5)
SARS-COV-2 RNA SPEC QL NAA+PROBE: SIGNIFICANT CHANGE UP
SODIUM SERPL-SCNC: 136 MMOL/L — SIGNIFICANT CHANGE UP (ref 135–145)
TSH SERPL-MCNC: 0.88 UIU/ML — SIGNIFICANT CHANGE UP (ref 0.27–4.2)
VIT B12 SERPL-MCNC: 270 PG/ML — SIGNIFICANT CHANGE UP (ref 232–1245)
WBC # BLD: 8.28 K/UL — SIGNIFICANT CHANGE UP (ref 3.8–10.5)
WBC # FLD AUTO: 8.28 K/UL — SIGNIFICANT CHANGE UP (ref 3.8–10.5)

## 2022-01-18 PROCEDURE — 93306 TTE W/DOPPLER COMPLETE: CPT | Mod: 26

## 2022-01-18 RX ADMIN — Medication 81 MILLIGRAM(S): at 17:25

## 2022-01-18 RX ADMIN — HEPARIN SODIUM 5000 UNIT(S): 5000 INJECTION INTRAVENOUS; SUBCUTANEOUS at 17:31

## 2022-01-18 RX ADMIN — CEFTRIAXONE 100 MILLIGRAM(S): 500 INJECTION, POWDER, FOR SOLUTION INTRAMUSCULAR; INTRAVENOUS at 17:26

## 2022-01-18 RX ADMIN — HEPARIN SODIUM 5000 UNIT(S): 5000 INJECTION INTRAVENOUS; SUBCUTANEOUS at 05:05

## 2022-01-18 RX ADMIN — SODIUM CHLORIDE 80 MILLILITER(S): 9 INJECTION, SOLUTION INTRAVENOUS at 00:08

## 2022-01-18 NOTE — PROGRESS NOTE ADULT - ASSESSMENT
9yo M     with PMHx of HTN, HLD  macular degen   monthly injections,  unsteadiness over the past day, 2 falls with no headstrike or LOC,   no recent fevers/chills, uri sxs, cough, abd pain      presenting with dizziness. He endorses that since yesterday at roughly 3pm he has been feeling unsteady on his feet and as if he will fall when he stands.    Denies any room spinning sensations or recent illnesses such as fevers, cough, rashes, etc  . Fallen earlier  on the street and hit his left side,  No difficulty ambulating afterwards and no head trauma. No blood thinner use.   daughter Mary [phone],  	PCP: Dr. Ole Ferrer       * dizziness/ with 2 falls    orthostatics   PT eval    echo. card eval   ct   head, no infarct   *  febrile  in er,  wbc is  11,000 on arrival   follow blood/ urine   c/s  cxr , normal   abnormal u/a/ uti   on iv rocephin  low  platelets,  from  infectious  process  *  elevated lfts,  are  decreasing    abd u.s  on dvt pxx       ra< from: CT Head No Cont (01.17.22 @ 17:25) >  IMPRESSION:  CT brain: No acute intracranial hemorrhage, mass effect, midline shift.   If clinical symptoms persist, may consider MRI for further evaluation.  CTA neck and brain: Mild stenosis at the carotid bifurcations   bilaterally. Otherwise, no vessel occlusion or significant stenosis.  --- End of Report ---  < end of copied text >   7yo M     with PMHx of HTN, HLD  macular degen   monthly injections,  unsteadiness over the past day, 2 falls with no headstrike or LOC,   no recent fevers/chills, uri sxs, cough, abd pain      presenting with dizziness. He endorses that since yesterday at roughly 3pm he has been feeling unsteady on his feet and as if he will fall when he stands.    Denies any room spinning sensations or recent illnesses such as fevers, cough, rashes, etc  . Fallen earlier  on the street and hit his left side,  No difficulty ambulating afterwards and no head trauma. No blood thinner use.   daughter Mary [phone],  	PCP: Dr. Ole Ferrer       * dizziness/ with 2 falls    orthostatics   PT eval    echo. card eval   ct   head, no infarct   *  febrile  in er,  wbc is  11,000 on arrival   follow blood/ urine   c/s  cxr , normal   abnormal u/a/ uti   on iv rocephin  low  platelets,  from  infectious  process/ heme  dr martinez  *  elevated lfts,  are  decreasing   CT a.p ,minue lesions  on dvt pxx       ra< from: CT Head No Cont (01.17.22 @ 17:25) >  IMPRESSION:  CT brain: No acute intracranial hemorrhage, mass effect, midline shift.   If clinical symptoms persist, may consider MRI for further evaluation.  CTA neck and brain: Mild stenosis at the carotid bifurcations   bilaterally. Otherwise, no vessel occlusion or significant stenosis.  --- End of Report ---  < end of copied text >

## 2022-01-18 NOTE — CONSULT NOTE ADULT - SUBJECTIVE AND OBJECTIVE BOX
HPI:  87yo M with PMHx of HTN, HLD, macular degen (monthly injections), admitted 1/17/22 withunsteadiness 1 day PTA day, 2 falls with no headstrike or LOC. Denied any room spinning sensations or recent illnesses such as fevers, cough, rashes, etc  Fall 1/17/22 on the street and hit his left side. No difficulty ambulating afterwards and no head trauma. No blood thinner use. pts daughter Mary [phone],   PCP: Dr. Ole Ferrer      PAST MEDICAL & SURGICAL HISTORY:  HTN (hypertension)    HLD (hyperlipidemia)        Allergies: NKA    Family history: Non contributory  Social history:  Meds:  aspirin enteric coated 81 milliGRAM(s) Oral daily  cefTRIAXone   IVPB 1000 milliGRAM(s) IV Intermittent every 24 hours  heparin   Injectable 5000 Unit(s) SubCutaneous every 12 hours  lactated ringers. 1000 milliLiter(s) IV Continuous <Continuous>    Labs:  CBC Full  -  ( 18 Jan 2022 06:19 )  WBC Count : 8.28 K/uL  Hemoglobin : 12.9 g/dL  Hematocrit : 40.1 %  Platelet Count - Automated : 106 K/uL  Mean Cell Volume : 87.7 fl  Mean Cell Hemoglobin : 28.2 pg  Mean Cell Hemoglobin Concentration : 32.2 gm/dL  Auto Neutrophil # : x  Auto Lymphocyte # : x  Auto Monocyte # : x  Auto Eosinophil # : x  Auto Basophil # : x  Auto Neutrophil % : x  Auto Lymphocyte % : x  Auto Monocyte % : x  Auto Eosinophil % : x  Auto Basophil % : x  Platelet Count - Automated: 106 K/uL (01.18.22 @ 06:19)   Platelet Count - Automated: 124 K/uL (01.17.22 @ 14:08)   Platelet Count - Automated: 165 K/uL (04.04.20 @ 21:11)   WBC Count: 8.28 K/uL (01.18.22 @ 06:19)   WBC Count: 11.84 K/uL (01.17.22 @ 14:08)   WBC Count: 9.36 K/uL (04.04.20 @ 21:11)   01-18    136  |  105  |  20  ----------------------------<  86  3.8   |  19<L>  |  0.93    Ca    8.8      18 Jan 2022 06:19    TPro  5.9<L>  /  Alb  3.2<L>  /  TBili  1.9<H>  /  DBili  0.4<H>  /  AST  59<H>  /  ALT  43  /  AlkPhos  110  01-18  Vitamin B12, Serum: 270 pg/mL       Radiology:     < from: CT Angio Head w/ IV Cont (01.17.22 @ 16:52) >  MPRESSION:    CT brain: No acute intracranial hemorrhage, mass effect, midline shift.   If clinical symptoms persist, may consider MRI for further evaluation.    CTA neck and brain: Mild stenosis at the carotid bifurcations   bilaterally. Otherwise, no vessel occlusion or significant stenosis.      < end of copied text >  < from: CT Abdomen and Pelvis w/ Oral Cont and w/ IV Cont (01.17.22 @ 23:49) >  IMPRESSION:  Diffuse colonic diverticulosis.    Scattered subcentimeter hypodense foci in the liver, too small to   characterize.      < end of copied text >          ROS:  No pain, no fever  No lumps in neck or pain  No Sob or chest pain  No palpitations   No abdominal pain. No change in bowel habit. No blood in stools  No weakness in extremities  No leg swelling      Vital Signs Last 24 Hrs  T(C): 36.8 (18 Jan 2022 11:50), Max: 38 (17 Jan 2022 18:12)  T(F): 98.3 (18 Jan 2022 11:50), Max: 100.4 (17 Jan 2022 18:12)  HR: 55 (18 Jan 2022 11:50) (55 - 88)  BP: 153/78 (18 Jan 2022 11:50) (111/61 - 208/92)  BP(mean): --  RR: 18 (18 Jan 2022 11:50) (18 - 18)  SpO2: 98% (18 Jan 2022 11:50) (95% - 99%)    Physical Exam:  Patient comfortable  AXOX3  Neck supple, no LN's  Chest: bilateral breath sounds, no wheeze or rales  CVS: regular heart rate without murmur  Abdomen: soft, BS+, no massess or organomegaly  CNS: no gross deficit  Ext: no edema       HPI:  89yo M with PMHx of HTN, HLD, macular degen (monthly injections), admitted 1/17/22 withunsteadiness 1 day PTA day, 2 falls with no headstrike or LOC. Denied any room spinning sensations or recent illnesses such as fevers, cough, rashes, etc  Fall 1/17/22 on the street and hit his left side. No difficulty ambulating afterwards and no head trauma. No blood thinner use. Daughter Mary [phone],   PCP: Dr. Ole Ferrer      PAST MEDICAL & SURGICAL HISTORY:  HTN (hypertension)    HLD (hyperlipidemia)        Allergies: NKA    Family history: Non contributory  Social history: Never smoker  Meds:  aspirin enteric coated 81 milliGRAM(s) Oral daily  cefTRIAXone   IVPB 1000 milliGRAM(s) IV Intermittent every 24 hours  heparin   Injectable 5000 Unit(s) SubCutaneous every 12 hours  lactated ringers. 1000 milliLiter(s) IV Continuous <Continuous>    Labs:  CBC Full  -  ( 18 Jan 2022 06:19 )  WBC Count : 8.28 K/uL  Hemoglobin : 12.9 g/dL  Hematocrit : 40.1 %  Platelet Count - Automated : 106 K/uL  Mean Cell Volume : 87.7 fl  Mean Cell Hemoglobin : 28.2 pg  Mean Cell Hemoglobin Concentration : 32.2 gm/dL  Auto Neutrophil # : x  Auto Lymphocyte # : x  Auto Monocyte # : x  Auto Eosinophil # : x  Auto Basophil # : x  Auto Neutrophil % : x  Auto Lymphocyte % : x  Auto Monocyte % : x  Auto Eosinophil % : x  Auto Basophil % : x  Platelet Count - Automated: 106 K/uL (01.18.22 @ 06:19)   Platelet Count - Automated: 124 K/uL (01.17.22 @ 14:08)   Platelet Count - Automated: 165 K/uL (04.04.20 @ 21:11)   WBC Count: 8.28 K/uL (01.18.22 @ 06:19)   WBC Count: 11.84 K/uL (01.17.22 @ 14:08)   WBC Count: 9.36 K/uL (04.04.20 @ 21:11)   01-18    136  |  105  |  20  ----------------------------<  86  3.8   |  19<L>  |  0.93    Ca    8.8      18 Jan 2022 06:19    TPro  5.9<L>  /  Alb  3.2<L>  /  TBili  1.9<H>  /  DBili  0.4<H>  /  AST  59<H>  /  ALT  43  /  AlkPhos  110  01-18  Vitamin B12, Serum: 270 pg/mL       Radiology:     < from: CT Angio Head w/ IV Cont (01.17.22 @ 16:52) >  MPRESSION:    CT brain: No acute intracranial hemorrhage, mass effect, midline shift.   If clinical symptoms persist, may consider MRI for further evaluation.    CTA neck and brain: Mild stenosis at the carotid bifurcations   bilaterally. Otherwise, no vessel occlusion or significant stenosis.      < end of copied text >  < from: CT Abdomen and Pelvis w/ Oral Cont and w/ IV Cont (01.17.22 @ 23:49) >  IMPRESSION:  Diffuse colonic diverticulosis.    Scattered subcentimeter hypodense foci in the liver, too small to   characterize.      < end of copied text >          ROS:  No pain, had low grade fever  No lumps in neck or pain  No Sob or chest pain  No palpitations   No abdominal pain. No change in bowel habit. No blood in stools  No weakness in extremities  No leg swelling      Vital Signs Last 24 Hrs  T(C): 36.8 (18 Jan 2022 11:50), Max: 38 (17 Jan 2022 18:12)  T(F): 98.3 (18 Jan 2022 11:50), Max: 100.4 (17 Jan 2022 18:12)  HR: 55 (18 Jan 2022 11:50) (55 - 88)  BP: 153/78 (18 Jan 2022 11:50) (111/61 - 208/92)  BP(mean): --  RR: 18 (18 Jan 2022 11:50) (18 - 18)  SpO2: 98% (18 Jan 2022 11:50) (95% - 99%)    Physical Exam:  Patient comfortable  Neck supple, no LN's  Chest: bilateral breath sounds, no wheeze or rales  CVS: regular heart rate without murmur  Abdomen: soft, BS+, no massess or organomegaly  CNS: no gross deficit  Ext: no edema

## 2022-01-18 NOTE — PATIENT PROFILE ADULT - FALL HARM RISK - HARM RISK INTERVENTIONS

## 2022-01-19 DIAGNOSIS — N39.0 URINARY TRACT INFECTION, SITE NOT SPECIFIED: ICD-10-CM

## 2022-01-19 DIAGNOSIS — R50.9 FEVER, UNSPECIFIED: ICD-10-CM

## 2022-01-19 LAB
-  AMIKACIN: SIGNIFICANT CHANGE UP
-  AMOXICILLIN/CLAVULANIC ACID: SIGNIFICANT CHANGE UP
-  AMPICILLIN/SULBACTAM: SIGNIFICANT CHANGE UP
-  AMPICILLIN: SIGNIFICANT CHANGE UP
-  AZTREONAM: SIGNIFICANT CHANGE UP
-  CEFAZOLIN: SIGNIFICANT CHANGE UP
-  CEFEPIME: SIGNIFICANT CHANGE UP
-  CEFOXITIN: SIGNIFICANT CHANGE UP
-  CEFTRIAXONE: SIGNIFICANT CHANGE UP
-  CIPROFLOXACIN: SIGNIFICANT CHANGE UP
-  ERTAPENEM: SIGNIFICANT CHANGE UP
-  GENTAMICIN: SIGNIFICANT CHANGE UP
-  IMIPENEM: SIGNIFICANT CHANGE UP
-  LEVOFLOXACIN: SIGNIFICANT CHANGE UP
-  MEROPENEM: SIGNIFICANT CHANGE UP
-  NITROFURANTOIN: SIGNIFICANT CHANGE UP
-  PIPERACILLIN/TAZOBACTAM: SIGNIFICANT CHANGE UP
-  TIGECYCLINE: SIGNIFICANT CHANGE UP
-  TOBRAMYCIN: SIGNIFICANT CHANGE UP
-  TRIMETHOPRIM/SULFAMETHOXAZOLE: SIGNIFICANT CHANGE UP
ALBUMIN SERPL ELPH-MCNC: 3.2 G/DL — LOW (ref 3.3–5)
ALP SERPL-CCNC: 104 U/L — SIGNIFICANT CHANGE UP (ref 40–120)
ALT FLD-CCNC: 35 U/L — SIGNIFICANT CHANGE UP (ref 10–45)
ANION GAP SERPL CALC-SCNC: 12 MMOL/L — SIGNIFICANT CHANGE UP (ref 5–17)
AST SERPL-CCNC: 62 U/L — HIGH (ref 10–40)
BASOPHILS # BLD AUTO: 0.02 K/UL — SIGNIFICANT CHANGE UP (ref 0–0.2)
BASOPHILS NFR BLD AUTO: 0.3 % — SIGNIFICANT CHANGE UP (ref 0–2)
BILIRUB SERPL-MCNC: 0.8 MG/DL — SIGNIFICANT CHANGE UP (ref 0.2–1.2)
BUN SERPL-MCNC: 22 MG/DL — SIGNIFICANT CHANGE UP (ref 7–23)
CALCIUM SERPL-MCNC: 8.9 MG/DL — SIGNIFICANT CHANGE UP (ref 8.4–10.5)
CHLORIDE SERPL-SCNC: 108 MMOL/L — SIGNIFICANT CHANGE UP (ref 96–108)
CO2 SERPL-SCNC: 21 MMOL/L — LOW (ref 22–31)
CREAT SERPL-MCNC: 1.14 MG/DL — SIGNIFICANT CHANGE UP (ref 0.5–1.3)
CULTURE RESULTS: SIGNIFICANT CHANGE UP
EOSINOPHIL # BLD AUTO: 0.32 K/UL — SIGNIFICANT CHANGE UP (ref 0–0.5)
EOSINOPHIL NFR BLD AUTO: 4.7 % — SIGNIFICANT CHANGE UP (ref 0–6)
GLUCOSE SERPL-MCNC: 94 MG/DL — SIGNIFICANT CHANGE UP (ref 70–99)
HCT VFR BLD CALC: 39.9 % — SIGNIFICANT CHANGE UP (ref 39–50)
HGB BLD-MCNC: 13 G/DL — SIGNIFICANT CHANGE UP (ref 13–17)
IMM GRANULOCYTES NFR BLD AUTO: 0.4 % — SIGNIFICANT CHANGE UP (ref 0–1.5)
LYMPHOCYTES # BLD AUTO: 1.25 K/UL — SIGNIFICANT CHANGE UP (ref 1–3.3)
LYMPHOCYTES # BLD AUTO: 18.4 % — SIGNIFICANT CHANGE UP (ref 13–44)
MCHC RBC-ENTMCNC: 28.5 PG — SIGNIFICANT CHANGE UP (ref 27–34)
MCHC RBC-ENTMCNC: 32.6 GM/DL — SIGNIFICANT CHANGE UP (ref 32–36)
MCV RBC AUTO: 87.5 FL — SIGNIFICANT CHANGE UP (ref 80–100)
METHOD TYPE: SIGNIFICANT CHANGE UP
MONOCYTES # BLD AUTO: 1 K/UL — HIGH (ref 0–0.9)
MONOCYTES NFR BLD AUTO: 14.7 % — HIGH (ref 2–14)
NEUTROPHILS # BLD AUTO: 4.19 K/UL — SIGNIFICANT CHANGE UP (ref 1.8–7.4)
NEUTROPHILS NFR BLD AUTO: 61.5 % — SIGNIFICANT CHANGE UP (ref 43–77)
NRBC # BLD: 0 /100 WBCS — SIGNIFICANT CHANGE UP (ref 0–0)
ORGANISM # SPEC MICROSCOPIC CNT: SIGNIFICANT CHANGE UP
ORGANISM # SPEC MICROSCOPIC CNT: SIGNIFICANT CHANGE UP
PLATELET # BLD AUTO: 125 K/UL — LOW (ref 150–400)
POTASSIUM SERPL-MCNC: 3.7 MMOL/L — SIGNIFICANT CHANGE UP (ref 3.5–5.3)
POTASSIUM SERPL-SCNC: 3.7 MMOL/L — SIGNIFICANT CHANGE UP (ref 3.5–5.3)
PROT SERPL-MCNC: 6 G/DL — SIGNIFICANT CHANGE UP (ref 6–8.3)
RBC # BLD: 4.56 M/UL — SIGNIFICANT CHANGE UP (ref 4.2–5.8)
RBC # FLD: 14.8 % — HIGH (ref 10.3–14.5)
SODIUM SERPL-SCNC: 141 MMOL/L — SIGNIFICANT CHANGE UP (ref 135–145)
SPECIMEN SOURCE: SIGNIFICANT CHANGE UP
WBC # BLD: 6.81 K/UL — SIGNIFICANT CHANGE UP (ref 3.8–10.5)
WBC # FLD AUTO: 6.81 K/UL — SIGNIFICANT CHANGE UP (ref 3.8–10.5)

## 2022-01-19 PROCEDURE — 99222 1ST HOSP IP/OBS MODERATE 55: CPT

## 2022-01-19 RX ORDER — PREGABALIN 225 MG/1
1000 CAPSULE ORAL ONCE
Refills: 0 | Status: COMPLETED | OUTPATIENT
Start: 2022-01-19 | End: 2022-01-19

## 2022-01-19 RX ADMIN — CEFTRIAXONE 100 MILLIGRAM(S): 500 INJECTION, POWDER, FOR SOLUTION INTRAMUSCULAR; INTRAVENOUS at 17:26

## 2022-01-19 RX ADMIN — HEPARIN SODIUM 5000 UNIT(S): 5000 INJECTION INTRAVENOUS; SUBCUTANEOUS at 06:46

## 2022-01-19 RX ADMIN — PREGABALIN 1000 MICROGRAM(S): 225 CAPSULE ORAL at 13:23

## 2022-01-19 RX ADMIN — Medication 81 MILLIGRAM(S): at 13:26

## 2022-01-19 RX ADMIN — HEPARIN SODIUM 5000 UNIT(S): 5000 INJECTION INTRAVENOUS; SUBCUTANEOUS at 17:26

## 2022-01-19 NOTE — PHYSICAL THERAPY INITIAL EVALUATION ADULT - PERTINENT HX OF CURRENT PROBLEM, REHAB EVAL
87 y/o M with h/o HTN, HLD, macular degeneration p/w recent unsteadiness and pt reported dizziness with resultant 2 falls. pt now reports feeling "much better."

## 2022-01-19 NOTE — CONSULT NOTE ADULT - ASSESSMENT
87yo M with PMHx of HTN, HLD, macular degen (monthly injections), stated ,unsteadiness over the past day, 2 falls with no headstrike or LOC with fever, possible UTI     Stuart Greene  Attending Physician   Division of Infectious Disease  Pager #[phone]  Available on Microsoft Teams also  After 5pm/weekend or no response, call #[phone]

## 2022-01-19 NOTE — PHYSICAL THERAPY INITIAL EVALUATION ADULT - GENERAL OBSERVATIONS, REHAB EVAL
pt nohelia 30 min eval well. pt rec'd standing in room independently in street clothes speaking with MD, gavin, A&Ox4. pt agreed to eval. orthostatics assessed (see vitals sheet). see initial evaluation document for functional assessment. no deficits identified on exam. pt left at [place] watching [place] football game, VSS, NAD, all lines intact, all needs met, cb in reach, rounding reinforced.

## 2022-01-19 NOTE — PHYSICAL THERAPY INITIAL EVALUATION ADULT - ADDITIONAL COMMENTS
pt states he lives with family in a private home with a few steps to enter (+handrail), first floor set up inside. Pt states prior to admission being independent with all functional mobility and ADLs. pt denies owning any DME.

## 2022-01-19 NOTE — PHYSICAL THERAPY INITIAL EVALUATION ADULT - DISCHARGE DISPOSITION, PT EVAL
No skilled PT needs at this time. no device needs. pt aware and in agreement. note left with ELA Lopes./no skilled PT needs

## 2022-01-19 NOTE — CONSULT NOTE ADULT - PROBLEM SELECTOR RECOMMENDATION 9
-cont CTX 1 gm iv q24  -has some worsening of urinary frequency which is better  -f/u final cx results to change to PO abx

## 2022-01-19 NOTE — PROGRESS NOTE ADULT - ASSESSMENT
9yo M     with PMHx of HTN, HLD  macular degen   monthly injections,  unsteadiness over the past day, 2 falls with no headstrike or LOC,   no recent fevers/chills, uri sxs, cough, abd pain      presenting with dizziness. He endorses that since yesterday at roughly 3pm he has been feeling unsteady on his feet and as if he will fall when he stands.    Denies any room spinning sensations or recent illnesses such as fevers, cough, rashes, etc  . Fallen earlier  on the street and hit his left side,  No difficulty ambulating afterwards and no head trauma. No blood thinner use.   daughter Mary [phone],  	PCP: Dr. Ole Ferrer       * dizziness/ with 2 falls    orthostatics    echo. normal  ef,  mod  AS   . card   TO F/P   ct   head, no infarct   *  febrile  in er,  wbc is  11,000 on arrival    blood   c/s, negative  urine   c/s. Klebsiella, await  sensitivities  cxr , normal   abnormal u/a/ uti   on iv rocephin  low  platelets,  from  infectious  process/ heme  dr martinez  *  elevated lfts,  are  decreasing   CT a.p ,minute liver lesions  on dvt pxx       ra< from: CT Head No Cont (01.17.22 @ 17:25) >  IMPRESSION:  CT brain: No acute intracranial hemorrhage, mass effect, midline shift.   If clinical symptoms persist, may consider MRI for further evaluation.  CTA neck and brain: Mild stenosis at the carotid bifurcations   bilaterally. Otherwise, no vessel occlusion or significant stenosis.  --- End of Report ---  < end of copied text >

## 2022-01-19 NOTE — PROGRESS NOTE ADULT - ASSESSMENT
89yo M with PMHx of HTN, HLD, macular degen (monthly injections), admitted 1/17/22 withunsteadiness 1 day PTA day, 2 falls with no headstrike or LOC. Denied any room spinning sensations or recent illnesses such as fevers, cough, rashes, etc  Fall 1/17/22 on the street and hit his left side. No difficulty ambulating afterwards and no head trauma. No blood thinner use.  Seen for thrombocytopenia.  CT minute liver lesions too small to characterize  Patient is being treated for UTI.  I think that new onset thrombocytopenia seen at arrival is likely related to infection. He does not have splenomegaly or other CBC abnormalities. LFT"s mildly elevated are being followed. Liver lesions too small to characterize can be followed on f/u imaging later. Platelets are already showing upward trend  B12 borderline, ordered MMA and give B12 after that in the meantime

## 2022-01-19 NOTE — PHYSICAL THERAPY INITIAL EVALUATION ADULT - PRECAUTIONS/LIMITATIONS, REHAB EVAL
HISTORY AND RESULTS CONTINUED: CT ABDOMEN/PELVIS: diffuse colonic diverticulosis. scattered subcentimeter hypodense foci in the liver, too small to characterize. US ABDOMEN: Mild central intrahepatic ductal dilatation. No evidence of cholelithiasis, choledocholithiasis or dilatation of the common bile duct. CT BRAIN: (-). CTA NECK/BRAIN: mild stenosis at the carotid bifurcations bilaterally. otherwise, no vessel occlusion or signficant stenosis. CHEST XRAY: clear/fall precautions

## 2022-01-19 NOTE — CONSULT NOTE ADULT - SUBJECTIVE AND OBJECTIVE BOX
LEONCIO ANDREW 88y Male  MRN-[id number]    Patient is a 88y old  Male who presents with a chief complaint of dizziness/ fall (2022 11:20)      HPI:  89yo M with PMHx of HTN, HLD   pt has hx HLD, macular degen (monthly injections), stated ,unsteadiness over the past day, 2 falls with no headstrike or LOC, no recent fevers/chills, uri sxs, cough, abd pain      presenting with dizziness. He endorses that since yesterday at roughly 3pm he has been feeling unsteady on his feet and as if he will fall when he stands.    Denies any room spinning sensations or recent illnesses such as fevers, cough, rashes, etc  . Fallen earlier today on the street and hit his left side  . No difficulty ambulating afterwards and no head trauma. No blood thinner use.  	   pt has hx HLD, macular degen (monthly injections), stated ,unsteadiness over the past day, 2 falls with no headstrike or LOC, no recent fevers/chills, uri sxs, cough, abd pain  	PCP: Dr. Ole Ferrer   (2022 19:41)      PAST MEDICAL & SURGICAL HISTORY:  HTN (hypertension)    HLD (hyperlipidemia)        Allergies    No Known Allergies    Intolerances        ANTIMICROBIALS:  cefTRIAXone   IVPB 1000 every 24 hours      MEDICATIONS  (STANDING):  aspirin enteric coated 81 milliGRAM(s) Oral daily  cefTRIAXone   IVPB 1000 milliGRAM(s) IV Intermittent every 24 hours  cyanocobalamin Injectable 1000 MICROGram(s) IntraMuscular once  heparin   Injectable 5000 Unit(s) SubCutaneous every 12 hours      Social History  Smoking:  Etoh:  Drug use:      FAMILY HISTORY:      Vital Signs Last 24 Hrs  T(C): 36.7 (2022 11:29), Max: 37.2 (2022 17:52)  T(F): 98.1 (2022 11:29), Max: 99 (2022 17:52)  HR: 72 (2022 11:29) (55 - 83)  BP: 119/74 (2022 11:29) (119/74 - 156/82)  BP(mean): 101 (2022 18:30) (101 - 101)  RR: 18 (2022 11:29) (18 - 18)  SpO2: 97% (2022 11:29) (96% - 100%)    CBC Full  -  ( 2022 07:20 )  WBC Count : 6.81 K/uL  RBC Count : 4.56 M/uL  Hemoglobin : 13.0 g/dL  Hematocrit : 39.9 %  Platelet Count - Automated : 125 K/uL  Mean Cell Volume : 87.5 fl  Mean Cell Hemoglobin : 28.5 pg  Mean Cell Hemoglobin Concentration : 32.6 gm/dL  Auto Neutrophil # : 4.19 K/uL  Auto Lymphocyte # : 1.25 K/uL  Auto Monocyte # : 1.00 K/uL  Auto Eosinophil # : 0.32 K/uL  Auto Basophil # : 0.02 K/uL  Auto Neutrophil % : 61.5 %  Auto Lymphocyte % : 18.4 %  Auto Monocyte % : 14.7 %  Auto Eosinophil % : 4.7 %  Auto Basophil % : 0.3 %        141  |  108  |  22  ----------------------------<  94  3.7   |  21<L>  |  1.14    Ca    8.9      2022 07:20    TPro  6.0  /  Alb  3.2<L>  /  TBili  0.8  /  DBili  x   /  AST  62<H>  /  ALT  35  /  AlkPhos  104      LIVER FUNCTIONS - ( 2022 07:20 )  Alb: 3.2 g/dL / Pro: 6.0 g/dL / ALK PHOS: 104 U/L / ALT: 35 U/L / AST: 62 U/L / GGT: x           Urinalysis Basic - ( 2022 17:50 )    Color: Light Yellow / Appearance: Slightly Turbid / S.050 / pH: x  Gluc: x / Ketone: Negative  / Bili: Negative / Urobili: Negative   Blood: x / Protein: 100 / Nitrite: Negative   Leuk Esterase: Large / RBC: >50 /hpf / WBC >50 /HPF   Sq Epi: x / Non Sq Epi: 0 /hpf / Bacteria: Moderate        MICROBIOLOGY:  .Blood Blood-Venous  22   No growth to date.  --  --      Clean Catch Clean Catch (Midstream)  22   >100,000 CFU/ml Klebsiella pneumoniae  --  --      RADIOLOGY  < from: CT Abdomen and Pelvis w/ Oral Cont and w/ IV Cont (22 @ 23:49) >  Diffuse colonic diverticulosis.    Scattered subcentimeter hypodense foci in the liver, too small to   characterize.    < end of copied text >  < from: US Abdomen Limited (22 @ 19:19) >  Mild central intrahepatic ductal dilatation. No evidence of   cholelithiasis, choledocholithiasis or dilatation of the common bile duct.    < end of copied text >  < from: Xray Chest 1 View- PORTABLE-Urgent (Xray Chest 1 View- PORTABLE-Urgent .) (22 @ 15:29) >  Clear lungs.    < end of copied text >  < from: CT Angio Head w/ IV Cont (22 @ 16:52) >  CT brain: No acute intracranial hemorrhage, mass effect, midline shift.   If clinical symptoms persist, may consider MRI for further evaluation.    CTA neck and brain: Mild stenosis at the carotid bifurcations   bilaterally. Otherwise, no vessel occlusion or significant stenosis.    < end of copied text >   LEONCIO ANDREW 88y Male  MRN-[id number]    Patient is a 88y old  Male who presents with a chief complaint of dizziness/ fall (2022 11:20)      HPI:  89yo M with PMHx of HTN, HLD   pt has hx HLD, macular degen (monthly injections), stated ,unsteadiness over the past day, 2 falls with no headstrike or LOC, no recent fevers/chills, uri sxs, cough, abd pain      presenting with dizziness. He endorses that since yesterday at roughly 3pm he has been feeling unsteady on his feet and as if he will fall when he stands.    Denies any room spinning sensations or recent illnesses such as fevers, cough, rashes, etc  . Fallen earlier today on the street and hit his left side  . No difficulty ambulating afterwards and no head trauma. No blood thinner use.  	   pt has hx HLD, macular degen (monthly injections), stated ,unsteadiness over the past day, 2 falls with no headstrike or LOC, no recent fevers/chills, uri sxs, cough, abd pain  	PCP: Dr. Ole Ferrer   (2022 19:41)    Had fever in [facility]  Dizziness better     PAST MEDICAL & SURGICAL HISTORY:  HTN (hypertension)    HLD (hyperlipidemia)        Allergies    No Known Allergies    Intolerances        ANTIMICROBIALS:  cefTRIAXone   IVPB 1000 every 24 hours      MEDICATIONS  (STANDING):  aspirin enteric coated 81 milliGRAM(s) Oral daily  cefTRIAXone   IVPB 1000 milliGRAM(s) IV Intermittent every 24 hours  cyanocobalamin Injectable 1000 MICROGram(s) IntraMuscular once  heparin   Injectable 5000 Unit(s) SubCutaneous every 12 hours      Social History  Smoking: former  Etoh: no  Drug use: no       FAMILY HISTORY: Parents -        Vital Signs Last 24 Hrs  T(C): 36.7 (2022 11:29), Max: 37.2 (2022 17:52)  T(F): 98.1 (2022 11:29), Max: 99 (2022 17:52)  HR: 72 (2022 11:29) (55 - 83)  BP: 119/74 (2022 11:29) (119/74 - 156/82)  BP(mean): 101 (2022 18:30) (101 - 101)  RR: 18 (2022 11:29) (18 - 18)  SpO2: 97% (2022 11:29) (96% - 100%)    CBC Full  -  ( 2022 07:20 )  WBC Count : 6.81 K/uL  RBC Count : 4.56 M/uL  Hemoglobin : 13.0 g/dL  Hematocrit : 39.9 %  Platelet Count - Automated : 125 K/uL  Mean Cell Volume : 87.5 fl  Mean Cell Hemoglobin : 28.5 pg  Mean Cell Hemoglobin Concentration : 32.6 gm/dL  Auto Neutrophil # : 4.19 K/uL  Auto Lymphocyte # : 1.25 K/uL  Auto Monocyte # : 1.00 K/uL  Auto Eosinophil # : 0.32 K/uL  Auto Basophil # : 0.02 K/uL  Auto Neutrophil % : 61.5 %  Auto Lymphocyte % : 18.4 %  Auto Monocyte % : 14.7 %  Auto Eosinophil % : 4.7 %  Auto Basophil % : 0.3 %        141  |  108  |  22  ----------------------------<  94  3.7   |  21<L>  |  1.14    Ca    8.9      2022 07:20    TPro  6.0  /  Alb  3.2<L>  /  TBili  0.8  /  DBili  x   /  AST  62<H>  /  ALT  35  /  AlkPhos  104      LIVER FUNCTIONS - ( 2022 07:20 )  Alb: 3.2 g/dL / Pro: 6.0 g/dL / ALK PHOS: 104 U/L / ALT: 35 U/L / AST: 62 U/L / GGT: x           Urinalysis Basic - ( 2022 17:50 )    Color: Light Yellow / Appearance: Slightly Turbid / S.050 / pH: x  Gluc: x / Ketone: Negative  / Bili: Negative / Urobili: Negative   Blood: x / Protein: 100 / Nitrite: Negative   Leuk Esterase: Large / RBC: >50 /hpf / WBC >50 /HPF   Sq Epi: x / Non Sq Epi: 0 /hpf / Bacteria: Moderate        MICROBIOLOGY:  .Blood Blood-Venous  22   No growth to date.  --  --      Clean Catch Clean Catch (Midstream)  22   >100,000 CFU/ml Klebsiella pneumoniae  --  --      RADIOLOGY  < from: CT Abdomen and Pelvis w/ Oral Cont and w/ IV Cont (22 @ 23:49) >  Diffuse colonic diverticulosis.    Scattered subcentimeter hypodense foci in the liver, too small to   characterize.    < end of copied text >  < from: US Abdomen Limited (22 @ 19:19) >  Mild central intrahepatic ductal dilatation. No evidence of   cholelithiasis, choledocholithiasis or dilatation of the common bile duct.    < end of copied text >  < from: Xray Chest 1 View- PORTABLE-Urgent (Xray Chest 1 View- PORTABLE-Urgent .) (22 @ 15:29) >  Clear lungs.    < end of copied text >  < from: CT Angio Head w/ IV Cont (22 @ 16:52) >  CT brain: No acute intracranial hemorrhage, mass effect, midline shift.   If clinical symptoms persist, may consider MRI for further evaluation.    CTA neck and brain: Mild stenosis at the carotid bifurcations   bilaterally. Otherwise, no vessel occlusion or significant stenosis.    < end of copied text >

## 2022-01-20 ENCOUNTER — TRANSCRIPTION ENCOUNTER (OUTPATIENT)
Age: 87
End: 2022-01-20

## 2022-01-20 VITALS
RESPIRATION RATE: 18 BRPM | OXYGEN SATURATION: 94 % | DIASTOLIC BLOOD PRESSURE: 61 MMHG | TEMPERATURE: 100 F | HEART RATE: 98 BPM | SYSTOLIC BLOOD PRESSURE: 102 MMHG

## 2022-01-20 LAB
ALBUMIN SERPL ELPH-MCNC: 3.4 G/DL — SIGNIFICANT CHANGE UP (ref 3.3–5)
ALP SERPL-CCNC: 115 U/L — SIGNIFICANT CHANGE UP (ref 40–120)
ALT FLD-CCNC: 39 U/L — SIGNIFICANT CHANGE UP (ref 10–45)
ANION GAP SERPL CALC-SCNC: 13 MMOL/L — SIGNIFICANT CHANGE UP (ref 5–17)
AST SERPL-CCNC: 66 U/L — HIGH (ref 10–40)
BASOPHILS # BLD AUTO: 0.02 K/UL — SIGNIFICANT CHANGE UP (ref 0–0.2)
BASOPHILS NFR BLD AUTO: 0.4 % — SIGNIFICANT CHANGE UP (ref 0–2)
BILIRUB SERPL-MCNC: 0.7 MG/DL — SIGNIFICANT CHANGE UP (ref 0.2–1.2)
BUN SERPL-MCNC: 23 MG/DL — SIGNIFICANT CHANGE UP (ref 7–23)
CALCIUM SERPL-MCNC: 9 MG/DL — SIGNIFICANT CHANGE UP (ref 8.4–10.5)
CHLORIDE SERPL-SCNC: 107 MMOL/L — SIGNIFICANT CHANGE UP (ref 96–108)
CO2 SERPL-SCNC: 19 MMOL/L — LOW (ref 22–31)
CREAT SERPL-MCNC: 0.99 MG/DL — SIGNIFICANT CHANGE UP (ref 0.5–1.3)
EOSINOPHIL # BLD AUTO: 0.31 K/UL — SIGNIFICANT CHANGE UP (ref 0–0.5)
EOSINOPHIL NFR BLD AUTO: 6 % — SIGNIFICANT CHANGE UP (ref 0–6)
GLUCOSE SERPL-MCNC: 84 MG/DL — SIGNIFICANT CHANGE UP (ref 70–99)
HCT VFR BLD CALC: 40.2 % — SIGNIFICANT CHANGE UP (ref 39–50)
HGB BLD-MCNC: 12.9 G/DL — LOW (ref 13–17)
IMM GRANULOCYTES NFR BLD AUTO: 0.4 % — SIGNIFICANT CHANGE UP (ref 0–1.5)
LYMPHOCYTES # BLD AUTO: 1.15 K/UL — SIGNIFICANT CHANGE UP (ref 1–3.3)
LYMPHOCYTES # BLD AUTO: 22.4 % — SIGNIFICANT CHANGE UP (ref 13–44)
MCHC RBC-ENTMCNC: 28.2 PG — SIGNIFICANT CHANGE UP (ref 27–34)
MCHC RBC-ENTMCNC: 32.1 GM/DL — SIGNIFICANT CHANGE UP (ref 32–36)
MCV RBC AUTO: 87.8 FL — SIGNIFICANT CHANGE UP (ref 80–100)
MONOCYTES # BLD AUTO: 0.72 K/UL — SIGNIFICANT CHANGE UP (ref 0–0.9)
MONOCYTES NFR BLD AUTO: 14 % — SIGNIFICANT CHANGE UP (ref 2–14)
NEUTROPHILS # BLD AUTO: 2.92 K/UL — SIGNIFICANT CHANGE UP (ref 1.8–7.4)
NEUTROPHILS NFR BLD AUTO: 56.8 % — SIGNIFICANT CHANGE UP (ref 43–77)
NRBC # BLD: 0 /100 WBCS — SIGNIFICANT CHANGE UP (ref 0–0)
PLATELET # BLD AUTO: 147 K/UL — LOW (ref 150–400)
POTASSIUM SERPL-MCNC: 3.9 MMOL/L — SIGNIFICANT CHANGE UP (ref 3.5–5.3)
POTASSIUM SERPL-SCNC: 3.9 MMOL/L — SIGNIFICANT CHANGE UP (ref 3.5–5.3)
PROT SERPL-MCNC: 6.2 G/DL — SIGNIFICANT CHANGE UP (ref 6–8.3)
RBC # BLD: 4.58 M/UL — SIGNIFICANT CHANGE UP (ref 4.2–5.8)
RBC # FLD: 14.8 % — HIGH (ref 10.3–14.5)
SODIUM SERPL-SCNC: 139 MMOL/L — SIGNIFICANT CHANGE UP (ref 135–145)
WBC # BLD: 5.14 K/UL — SIGNIFICANT CHANGE UP (ref 3.8–10.5)
WBC # FLD AUTO: 5.14 K/UL — SIGNIFICANT CHANGE UP (ref 3.8–10.5)

## 2022-01-20 PROCEDURE — 83921 ORGANIC ACID SINGLE QUANT: CPT

## 2022-01-20 PROCEDURE — 97161 PT EVAL LOW COMPLEX 20 MIN: CPT

## 2022-01-20 PROCEDURE — 85027 COMPLETE CBC AUTOMATED: CPT

## 2022-01-20 PROCEDURE — 87186 SC STD MICRODIL/AGAR DIL: CPT

## 2022-01-20 PROCEDURE — 87077 CULTURE AEROBIC IDENTIFY: CPT

## 2022-01-20 PROCEDURE — 71045 X-RAY EXAM CHEST 1 VIEW: CPT

## 2022-01-20 PROCEDURE — 99232 SBSQ HOSP IP/OBS MODERATE 35: CPT

## 2022-01-20 PROCEDURE — U0005: CPT

## 2022-01-20 PROCEDURE — 80076 HEPATIC FUNCTION PANEL: CPT

## 2022-01-20 PROCEDURE — 82607 VITAMIN B-12: CPT

## 2022-01-20 PROCEDURE — 87086 URINE CULTURE/COLONY COUNT: CPT

## 2022-01-20 PROCEDURE — 81001 URINALYSIS AUTO W/SCOPE: CPT

## 2022-01-20 PROCEDURE — 93306 TTE W/DOPPLER COMPLETE: CPT

## 2022-01-20 PROCEDURE — 80053 COMPREHEN METABOLIC PANEL: CPT

## 2022-01-20 PROCEDURE — U0003: CPT

## 2022-01-20 PROCEDURE — 99291 CRITICAL CARE FIRST HOUR: CPT

## 2022-01-20 PROCEDURE — 36415 COLL VENOUS BLD VENIPUNCTURE: CPT

## 2022-01-20 PROCEDURE — 87040 BLOOD CULTURE FOR BACTERIA: CPT

## 2022-01-20 PROCEDURE — 76705 ECHO EXAM OF ABDOMEN: CPT

## 2022-01-20 PROCEDURE — 70450 CT HEAD/BRAIN W/O DYE: CPT | Mod: MA

## 2022-01-20 PROCEDURE — 84443 ASSAY THYROID STIM HORMONE: CPT

## 2022-01-20 PROCEDURE — 74177 CT ABD & PELVIS W/CONTRAST: CPT

## 2022-01-20 PROCEDURE — 84484 ASSAY OF TROPONIN QUANT: CPT

## 2022-01-20 PROCEDURE — 82962 GLUCOSE BLOOD TEST: CPT

## 2022-01-20 PROCEDURE — 70496 CT ANGIOGRAPHY HEAD: CPT | Mod: MA

## 2022-01-20 PROCEDURE — 85025 COMPLETE CBC W/AUTO DIFF WBC: CPT

## 2022-01-20 PROCEDURE — 70498 CT ANGIOGRAPHY NECK: CPT | Mod: MA

## 2022-01-20 PROCEDURE — 80048 BASIC METABOLIC PNL TOTAL CA: CPT

## 2022-01-20 PROCEDURE — 82248 BILIRUBIN DIRECT: CPT

## 2022-01-20 RX ORDER — CEFPODOXIME PROXETIL 100 MG
1 TABLET ORAL
Qty: 9 | Refills: 0
Start: 2022-01-20 | End: 2022-01-23

## 2022-01-20 RX ORDER — ASPIRIN/CALCIUM CARB/MAGNESIUM 324 MG
1 TABLET ORAL
Qty: 0 | Refills: 0 | DISCHARGE
Start: 2022-01-20

## 2022-01-20 RX ORDER — CEFPODOXIME PROXETIL 100 MG
100 TABLET ORAL EVERY 12 HOURS
Refills: 0 | Status: DISCONTINUED | OUTPATIENT
Start: 2022-01-20 | End: 2022-01-20

## 2022-01-20 RX ADMIN — Medication 81 MILLIGRAM(S): at 11:32

## 2022-01-20 RX ADMIN — HEPARIN SODIUM 5000 UNIT(S): 5000 INJECTION INTRAVENOUS; SUBCUTANEOUS at 05:54

## 2022-01-20 NOTE — PROGRESS NOTE ADULT - PROBLEM SELECTOR PLAN 1
-better  -cefpodoxime 100 mg po bid x 3 more days  -potential side effects of abx explained including GI, Cdiff, allergy issues, development of resistance, etc.

## 2022-01-20 NOTE — DISCHARGE NOTE NURSING/CASE MANAGEMENT/SOCIAL WORK - NSDCFUADDAPPT_GEN_ALL_CORE_FT
You must follow up with your primary medical doctor within 3-4 days of discharge - please call to make an appointment.        APPTS ARE READY TO BE MADE: [x ] YES    Best Family or Patient Contact (if needed):    Additional Information about above appointments (if needed):    1:   2:   3:     Other comments or requests:

## 2022-01-20 NOTE — DISCHARGE NOTE PROVIDER - NSDCCPCAREPLAN_GEN_ALL_CORE_FT
PRINCIPAL DISCHARGE DIAGNOSIS  Diagnosis: Generally unsteady  Assessment and Plan of Treatment:       SECONDARY DISCHARGE DIAGNOSES  Diagnosis: Acute UTI  Assessment and Plan of Treatment: HOME CARE INSTRUCTIONS  Drink enough water and fluids to keep your urine clear or pale yellow.  Avoid caffeine, tea, and carbonated beverages. They tend to irritate your bladder.  Empty your bladder often. Avoid holding urine for long periods of time.  Empty your bladder before and after sexual intercourse.  After a bowel movement, women should cleanse from front to back. Use each tissue only once.  SEEK MEDICAL CARE IF:  You have back pain.  You develop a fever.  Your symptoms do not begin to resolve within 3 days.  SEEK IMMEDIATE MEDICAL CARE IF:  You have severe back pain or lower abdominal pain.  You develop chills.  You have nausea or vomiting.  You have continued burning or discomfort with urination.      Diagnosis: Hypertension  Assessment and Plan of Treatment: Low salt diet  Activity as tolerated.  Take all medication as prescribed.  Follow up with your medical doctor for routine blood pressure monitoring at your next visit.  Notify your doctor if you have any of the following symptoms:   Dizziness, Lightheadedness, Blurry vision, Headache, Chest pain, Shortness of breath       PRINCIPAL DISCHARGE DIAGNOSIS  Diagnosis: Generally unsteady  Assessment and Plan of Treatment: Resolved  You must follow up with your primary medical doctor within 3-4 days of discharge.      SECONDARY DISCHARGE DIAGNOSES  Diagnosis: Acute UTI  Assessment and Plan of Treatment: You will take your antibiotic, Cefpodoxime, through 1/24/2022 and then discontinue.  HOME CARE INSTRUCTIONS  Drink enough water and fluids to keep your urine clear or pale yellow.  Avoid caffeine, tea, and carbonated beverages. They tend to irritate your bladder.  Empty your bladder often. Avoid holding urine for long periods of time.  Empty your bladder before and after sexual intercourse.  After a bowel movement, women should cleanse from front to back. Use each tissue only once.  SEEK MEDICAL CARE IF:  You have back pain.  You develop a fever.  Your symptoms do not begin to resolve within 3 days.  SEEK IMMEDIATE MEDICAL CARE IF:  You have severe back pain or lower abdominal pain.  You develop chills.  You have nausea or vomiting.  You have continued burning or discomfort with urination.      Diagnosis: Hypertension  Assessment and Plan of Treatment: Low salt diet  Activity as tolerated.  Take all medication as prescribed.  Follow up with your medical doctor for routine blood pressure monitoring at your next visit.  Notify your doctor if you have any of the following symptoms:   Dizziness, Lightheadedness, Blurry vision, Headache, Chest pain, Shortness of breath

## 2022-01-20 NOTE — DISCHARGE NOTE NURSING/CASE MANAGEMENT/SOCIAL WORK - NSDCPEFALRISK_GEN_ALL_CORE
For information on Fall & Injury Prevention, visit: [email or web address] OR  [email or web address] OR  https://www.cdc.gov/steadi/patient.html

## 2022-01-20 NOTE — DISCHARGE NOTE NURSING/CASE MANAGEMENT/SOCIAL WORK - PATIENT PORTAL LINK FT
You can access the FollowMyHealth Patient Portal offered by [facility] by registering at the following website: [email or web address]. By joining [facility]’s FollowMyHealth portal, you will also be able to view your health information using other applications (apps) compatible with our system.

## 2022-01-20 NOTE — PROGRESS NOTE ADULT - REASON FOR ADMISSION
dizziness/ fall

## 2022-01-20 NOTE — DISCHARGE NOTE PROVIDER - HOSPITAL COURSE
88 year old male, with PMHx of HTN, HLD, and macular degeneration (monthly injections), admitted with dizziness/unsteadiness over the past day and 2 falls with no headstrike or LOC.  CT head negative.  CTA head/neck with mild stenosis at the carotid bifurcations bilaterally.  Chest xray shows clear lungs.  CT abdomen/pelvis shows diffuse colonic diverticulosis; scattered subcentimeter hypodense foci in the liver, too small to characterize.  Urinalysis positive.  ID consulted.  Urine culture with klebsiella pneumoniae.  Patient started on Ceftriaxone.    Transthoracic echocardiogram shows EF of 67% with 1. Calcified trileaflet aortic valve with decreased  opening. Peak transaortic valve gradient equals 41 mm Hg,  mean transaortic valve gradient equals 19 mm Hg, estimated  aortic valve area equals 1.4 sqcm (by continuity equation),  aortic valve velocity time integral equals 69 cm,  consistent with moderate aortic stenosis.  2. Moderately dilated left atrium.  LA volume index = 43  cc/m2.  3. Basal septal hypertrophy.  4. Normal left ventricular systolic function. No segmental  wall motion abnormalities.  5. Moderate diastolic dysfunction (Stage II).   Increased  E/e'  is consistent with elevated left ventricular filling  pressure.     7yo M     with PMHx of HTN, HLD  macular degen   monthly injections,  unsteadiness over the past day, 2 falls with no headstrike or LOC,   no recent fevers/chills, uri sxs, cough, abd pain      presenting with dizziness. He endorses that since yesterday at roughly 3pm he has been feeling unsteady on his feet and as if he will fall when he stands.    Denies any room spinning sensations or recent illnesses such as fevers, cough, rashes, etc  . Fallen earlier  on the street and hit his left side,  No difficulty ambulating afterwards and no head trauma. No blood thinner use.   daughter Mary [phone],  	PCP: Dr. Ole Ferrer       * dizziness/ with 2 falls    orthostatics were  negative    echo. normal  ef,  mod  AS    ct   head, no infarct   *  febrile  in er,  wbc is  11,000 on arrival    blood   c/s, negative  urine   c/s. Klebsiella,   icxr , normal   abnormal u/a/ uti   on iv rocephin  low  platelets,  from  infectious  process/ heme  dr martinez  *  elevated lfts,  are  decreasing   CT a.p ,minute liver lesions  on dvt pxx  awiat  ID to  switch  to oral  ab  and  then may   d/c  pt         ra< from: CT Head No Cont (01.17.22 @ 17:25) >  IMPRESSION:  CT brain: No acute intracranial hemorrhage, mass effect, midline shift.   If clinical symptoms persist, may consider MRI for further evaluation.  CTA neck and brain: Mild stenosis at the carotid bifurcations   bilaterally. Otherwise, no vessel occlusion or significant stenosis.  --- End of Report ---  < end of copied text >

## 2022-01-20 NOTE — DISCHARGE NOTE PROVIDER - NSDCMRMEDTOKEN_GEN_ALL_CORE_FT
dorzolamide-timolol 2.23%-0.68% ophthalmic solution: 1 drop(s) in each eye 2 times a day  oxybutynin 10 mg/24 hr oral tablet, extended release: 1 tab(s) orally once a day  simvastatin 20 mg oral tablet: 1 tab(s) orally once a day (at bedtime)   aspirin 81 mg oral delayed release tablet: 1 tab(s) orally once a day  cefpodoxime 100 mg oral tablet: 1 tab(s) orally every 12 hours    Take through 1/24/2022 and then discontinue  dorzolamide-timolol 2.23%-0.68% ophthalmic solution: 1 drop(s) in each eye 2 times a day  oxybutynin 10 mg/24 hr oral tablet, extended release: 1 tab(s) orally once a day  simvastatin 20 mg oral tablet: 1 tab(s) orally once a day (at bedtime)

## 2022-01-20 NOTE — DISCHARGE NOTE PROVIDER - CARE PROVIDER_API CALL
ROSALIO, PETER  Geriatric Medicine-Internal Medicine  [address] [place] [address]  Phone: [phone]  Fax: [phone]  Follow Up Time:

## 2022-01-20 NOTE — PROGRESS NOTE ADULT - ASSESSMENT
7yo M     with PMHx of HTN, HLD  macular degen   monthly injections,  unsteadiness over the past day, 2 falls with no headstrike or LOC,   no recent fevers/chills, uri sxs, cough, abd pain      presenting with dizziness. He endorses that since yesterday at roughly 3pm he has been feeling unsteady on his feet and as if he will fall when he stands.    Denies any room spinning sensations or recent illnesses such as fevers, cough, rashes, etc  . Fallen earlier  on the street and hit his left side,  No difficulty ambulating afterwards and no head trauma. No blood thinner use.   daughter Mary [phone],  	PCP: Dr. Ole Ferrer       * dizziness/ with 2 falls    orthostatics were  negative    echo. normal  ef,  mod  AS   . card   TO F/P   ct   head, no infarct   *  febrile  in er,  wbc is  11,000 on arrival    blood   c/s, negative  urine   c/s. Klebsiella,   icxr , normal   abnormal u/a/ uti   on iv rocephin  low  platelets,  from  infectious  process/ heme  dr martinez  *  elevated lfts,  are  decreasing   CT a.p ,minute liver lesions  on dvt pxx  awiat  ID to  switch  to oral  ab  and  then may   d/c  pt         ra< from: CT Head No Cont (01.17.22 @ 17:25) >  IMPRESSION:  CT brain: No acute intracranial hemorrhage, mass effect, midline shift.   If clinical symptoms persist, may consider MRI for further evaluation.  CTA neck and brain: Mild stenosis at the carotid bifurcations   bilaterally. Otherwise, no vessel occlusion or significant stenosis.  --- End of Report ---  < end of copied text >

## 2022-01-20 NOTE — PROGRESS NOTE ADULT - SUBJECTIVE AND OBJECTIVE BOX
CARDIOLOGY     PROGRESS  NOTE   ________________________________________________    CHIEF COMPLAINT:Patient is a 88y old  Male who presents with a chief complaint of dizziness/ fall (19 Jan 2022 07:52)  no comoplain.  	  REVIEW OF SYSTEMS:  CONSTITUTIONAL: No fever, weight loss, or fatigue  EYES: No eye pain, visual disturbances, or discharge  ENT:  No difficulty hearing, tinnitus, vertigo; No sinus or throat pain  NECK: No pain or stiffness  RESPIRATORY: No cough, wheezing, chills or hemoptysis; No Shortness of Breath  CARDIOVASCULAR: No chest pain, palpitations, passing out, dizziness, or leg swelling  GASTROINTESTINAL: No abdominal or epigastric pain. No nausea, vomiting, or hematemesis; No diarrhea or constipation. No melena or hematochezia.  GENITOURINARY: No dysuria, frequency, hematuria, or incontinence  NEUROLOGICAL: No headaches, memory loss, loss of strength, numbness, or tremors  SKIN: No itching, burning, rashes, or lesions   LYMPH Nodes: No enlarged glands  ENDOCRINE: No heat or cold intolerance; No hair loss  MUSCULOSKELETAL: No joint pain or swelling; No muscle, back, or extremity pain  PSYCHIATRIC: No depression, anxiety, mood swings, or difficulty sleeping  HEME/LYMPH: No easy bruising, or bleeding gums  ALLERGY AND IMMUNOLOGIC: No hives or eczema	    [ ] All others negative	  [ ] Unable to obtain    PHYSICAL EXAM:  T(C): 36.6 (01-19-22 @ 05:57), Max: 37.2 (01-18-22 @ 17:52)  HR: 67 (01-19-22 @ 05:57) (55 - 83)  BP: 125/67 (01-19-22 @ 05:57) (125/67 - 156/82)  RR: 18 (01-19-22 @ 05:57) (18 - 18)  SpO2: 96% (01-19-22 @ 05:57) (96% - 100%)  Wt(kg): --  I&O's Summary    18 Jan 2022 07:01  -  19 Jan 2022 07:00  --------------------------------------------------------  IN: 235 mL / OUT: 0 mL / NET: 235 mL        Appearance: Normal	  HEENT:   Normal oral mucosa, PERRL, EOMI	  Lymphatic: No lymphadenopathy  Cardiovascular: Normal S1 S2, No JVD, +murmurs, No edema  Respiratory: Lungs clear to auscultation	  Psychiatry: A & O x 3, Mood & affect appropriate  Gastrointestinal:  Soft, Non-tender, + BS	  Skin: No rashes, No ecchymoses, No cyanosis	  Neurologic: Non-focal  Extremities: Normal range of motion, No clubbing, cyanosis or edema  Vascular: Peripheral pulses palpable 2+ bilaterally    MEDICATIONS  (STANDING):  aspirin enteric coated 81 milliGRAM(s) Oral daily  cefTRIAXone   IVPB 1000 milliGRAM(s) IV Intermittent every 24 hours  heparin   Injectable 5000 Unit(s) SubCutaneous every 12 hours      TELEMETRY: 	    ECG:  	  RADIOLOGY:  OTHER: 	  	  LABS:	 	    CARDIAC MARKERS:                                13.0   6.81  )-----------( 125      ( 19 Jan 2022 07:20 )             39.9     01-19    141  |  108  |  22  ----------------------------<  94  3.7   |  21<L>  |  1.14    Ca    8.9      19 Jan 2022 07:20    TPro  6.0  /  Alb  3.2<L>  /  TBili  0.8  /  DBili  x   /  AST  62<H>  /  ALT  35  /  AlkPhos  104  01-19    proBNP:   Lipid Profile:   HgA1c:   TSH: Thyroid Stimulating Hormone, Serum: 0.88 uIU/mL (01-18 @ 08:50)    < from: Transthoracic Echocardiogram (01.18.22 @ 14:14) >  1. Calcified trileaflet aortic valve with decreased  opening. Peak transaortic valve gradient equals 41 mm Hg,  mean transaortic valve gradient equals 19 mm Hg, estimated  aortic valve area equals 1.4 sqcm (by continuity equation),  aorticvalve velocity time integral equals 69 cm,  consistent with moderate aortic stenosis.  2. Moderately dilated left atrium.  LA volume index = 43  cc/m2.  3. Basal septal hypertrophy.  4. Normal left ventricular systolic function. No segmental  wall motion abnormalities.  5. Moderate diastolic dysfunction (Stage II).   Increased  E/e'  is consistent with elevated left ventricular filling  pressure.    < from: CT Abdomen and Pelvis w/ Oral Cont and w/ IV Cont (01.17.22 @ 23:49) >  Diffuse colonic diverticulosis.    Scattered subcentimeter hypodense foci in the liver, too small to   characterize.        < from: 12 Lead ECG (04.04.20 @ 20:19) >  Diagnosis Line NORMAL SINUS RHYTHM WITH SINUS ARRHYTHMIA  POSSIBLE ANTERIOR INFARCT , AGE UNDETERMINED  ABNORMAL ECG          Assessment and plan  ---------------------------  87yo M with PMHx of HTN, HLD pt has hx HLD, macular degen (monthly injections), stated ,unsteadiness over the past day, 2 falls with no headstrike or LOC, no recent fevers/chills, uri sxs, cough, abd pain  presenting with dizziness. He endorses that since yesterday at roughly 3pm he has been feeling unsteady on his feet and as if he will fall when he stands.    Denies any room spinning sensations or recent illnesses such as fevers, cough, rashes, etc  . Fallen earlier today on the street and hit his left side  . No difficulty ambulating afterwards and no head trauma. No blood thinner use. pt with sig cardiac risk factor for CAD, with dizziness.  ct angio head was noted recommending ?brain MRI  check orthostatic, awaiting  asa  daily  physical therapy  temp , +UA ?uti start on abx  gentle hydration  echo noted with MOD AS/ diastolic dysfunction  will add beta blocker as tolerated  dvt prophylaxis    	        
           CARDIOLOGY     PROGRESS  NOTE   ________________________________________________    CHIEF COMPLAINT:Patient is a 88y old  Male who presents with a chief complaint of dizziness/ fall (20 Jan 2022 07:03)  no complain.  	  REVIEW OF SYSTEMS:  CONSTITUTIONAL: No fever, weight loss, or fatigue  EYES: No eye pain, visual disturbances, or discharge  ENT:  No difficulty hearing, tinnitus, vertigo; No sinus or throat pain  NECK: No pain or stiffness  RESPIRATORY: No cough, wheezing, chills or hemoptysis; No Shortness of Breath  CARDIOVASCULAR: No chest pain, palpitations, passing out, dizziness, or leg swelling  GASTROINTESTINAL: No abdominal or epigastric pain. No nausea, vomiting, or hematemesis; No diarrhea or constipation. No melena or hematochezia.  GENITOURINARY: No dysuria, frequency, hematuria, or incontinence  NEUROLOGICAL: No headaches, memory loss, loss of strength, numbness, or tremors  SKIN: No itching, burning, rashes, or lesions   LYMPH Nodes: No enlarged glands  ENDOCRINE: No heat or cold intolerance; No hair loss  MUSCULOSKELETAL: No joint pain or swelling; No muscle, back, or extremity pain  PSYCHIATRIC: No depression, anxiety, mood swings, or difficulty sleeping  HEME/LYMPH: No easy bruising, or bleeding gums  ALLERGY AND IMMUNOLOGIC: No hives or eczema	    [ ] All others negative	  [ ] Unable to obtain    PHYSICAL EXAM:  T(C): 36.7 (01-20-22 @ 05:04), Max: 36.8 (01-19-22 @ 20:40)  HR: 72 (01-20-22 @ 05:04) (62 - 72)  BP: 133/80 (01-20-22 @ 05:04) (119/74 - 133/80)  RR: 18 (01-20-22 @ 05:04) (18 - 18)  SpO2: 97% (01-20-22 @ 05:04) (97% - 99%)  Wt(kg): --  I&O's Summary    19 Jan 2022 07:01  -  20 Jan 2022 07:00  --------------------------------------------------------  IN: 600 mL / OUT: 0 mL / NET: 600 mL        Appearance: Normal	  HEENT:   Normal oral mucosa, PERRL, EOMI	  Lymphatic: No lymphadenopathy  Cardiovascular: Normal S1 S2, No JVD, + murmurs, No edema  Respiratory: rhonchi  Psychiatry: A & O x 3, Mood & affect appropriate  Gastrointestinal:  Soft, Non-tender, + BS	  Skin: No rashes, No ecchymoses, No cyanosis	  Neurologic: Non-focal  Extremities: Normal range of motion, No clubbing, cyanosis or edema  Vascular: Peripheral pulses palpable 2+ bilaterally    MEDICATIONS  (STANDING):  aspirin enteric coated 81 milliGRAM(s) Oral daily  cefTRIAXone   IVPB 1000 milliGRAM(s) IV Intermittent every 24 hours  heparin   Injectable 5000 Unit(s) SubCutaneous every 12 hours      TELEMETRY: 	    ECG:  	  RADIOLOGY:  OTHER: 	  	  LABS:	 	    CARDIAC MARKERS:                                12.9   5.14  )-----------( 147      ( 20 Jan 2022 06:52 )             40.2     01-20    139  |  107  |  23  ----------------------------<  84  3.9   |  19<L>  |  0.99    Ca    9.0      20 Jan 2022 06:52    TPro  6.2  /  Alb  3.4  /  TBili  0.7  /  DBili  x   /  AST  66<H>  /  ALT  39  /  AlkPhos  115  01-20    proBNP:   Lipid Profile:   HgA1c:   TSH: Thyroid Stimulating Hormone, Serum: 0.88 uIU/mL (01-18 @ 08:50)    Culture - Urine (01.17.22 @ 21:56)    -  Tobramycin: S <=2    -  Trimethoprim/Sulfamethoxazole: S <=0.5/9.5    -  Meropenem: S <=1    -  Nitrofurantoin: S <=32 Should not be used to treat pyelonephritis    -  Piperacillin/Tazobactam: S <=8    -  Tigecycline: S <=2    -  Amikacin: S <=16    -  Amoxicillin/Clavulanic Acid: S <=8/4    -  Ampicillin: R 16 These ampicillin results predict results for amoxicillin    -  Ampicillin/Sulbactam: S <=4/2 Enterobacter, Klebsiella aerogenes, Citrobacter, and Serratia may develop resistance during prolonged therapy (3-4 days)    -  Aztreonam: S <=4    -  Cefazolin: S <=2 (MIC_CL_[id number]) For uncomplicated UTI with K. pneumoniae, E. coli, or P. mirablis: CARINA <=16 is sensitive and CARINA >=32 is resistant. This also predicts results for oral agents cefaclor, cefdinir, cefpodoxime, cefprozil, cefuroxime axetil, cephalexin and locarbef for uncomplicated UTI. Note that some isolates may be susceptible to these agents while testing resistant to cefazolin.    -  Cefepime: S <=2    -  Cefoxitin: S <=8    -  Ceftriaxone: S <=1 Enterobacter, Klebsiella aerogenes, Citrobacter, and Serratia may develop resistance during prolonged therapy    -  Ciprofloxacin: S <=0.25    -  Ertapenem: S <=0.5    -  Gentamicin: S <=2    -  Imipenem: S <=1    -  Levofloxacin: S <=0.5    Specimen Source: Clean Catch Clean Catch (Midstream)    Culture Results:   >100,000 CFU/ml Klebsiella pneumoniae    Organism Identification: Klebsiella pneumoniae    Organism: Klebsiella pneumoniae    Method Type: CARINA  < from: Transthoracic Echocardiogram (01.18.22 @ 14:14) >  1. Calcified trileaflet aortic valve with decreased  opening. Peak transaortic valve gradient equals 41 mm Hg,  mean transaortic valve gradient equals 19 mm Hg, estimated  aortic valve area equals 1.4 sqcm (by continuity equation),  aorticvalve velocity time integral equals 69 cm,  consistent with moderate aortic stenosis.  2. Moderately dilated left atrium.  LA volume index = 43  cc/m2.  3. Basal septal hypertrophy.  4. Normal left ventricular systolic function. No segmental  wall motion abnormalities.  5. Moderate diastolic dysfunction (Stage II).   Increased  E/e'  is consistent with elevated left ventricular filling  pressure.        Assessment and plan  ---------------------------  87yo M with PMHx of HTN, HLD pt has hx HLD, macular degen (monthly injections), stated ,unsteadiness over the past day, 2 falls with no headstrike or LOC, no recent fevers/chills, uri sxs, cough, abd pain  presenting with dizziness. He endorses that since yesterday at roughly 3pm he has been feeling unsteady on his feet and as if he will fall when he stands.    Denies any room spinning sensations or recent illnesses such as fevers, cough, rashes, etc  . Fallen earlier today on the street and hit his left side  . No difficulty ambulating afterwards and no head trauma. No blood thinner use. pt with sig cardiac risk factor for CAD, with dizziness.  ct angio head was noted recommending ?brain MRI  check orthostatic, awaiting  asa  daily  physical therapy  gentle hydration  echo noted with MOD AS/ diastolic dysfunction  will add beta blocker as tolerated  dvt prophylaxis  uti, Klebsiella, continue abx  'avoid dehydration    	        
  afberile    REVIEW OF SYSTEMS:  GEN: no fever,    no chills  RESP: no SOB,   no cough  CVS: no chest pain,   no palpitations  GI: no abdominal pain,   no nausea,   no vomiting,   no constipation,   no diarrhea  : no dysuria,   no frequency  NEURO: no headache,   no dizziness  PSYCH: no depression,   not anxious  Derm : no rash    MEDICATIONS  (STANDING):  aspirin enteric coated 81 milliGRAM(s) Oral daily  cefTRIAXone   IVPB 1000 milliGRAM(s) IV Intermittent every 24 hours  heparin   Injectable 5000 Unit(s) SubCutaneous every 12 hours    MEDICATIONS  (PRN):      Vital Signs Last 24 Hrs  T(C): 36.7 (20 Jan 2022 05:04), Max: 36.8 (19 Jan 2022 20:40)  T(F): 98 (20 Jan 2022 05:04), Max: 98.3 (19 Jan 2022 20:40)  HR: 72 (20 Jan 2022 05:04) (62 - 72)  BP: 133/80 (20 Jan 2022 05:04) (119/74 - 133/80)  BP(mean): --  RR: 18 (20 Jan 2022 05:04) (18 - 18)  SpO2: 97% (20 Jan 2022 05:04) (97% - 99%)  CAPILLARY BLOOD GLUCOSE        I&O's Summary    19 Jan 2022 07:01  -  20 Jan 2022 07:00  --------------------------------------------------------  IN: 600 mL / OUT: 0 mL / NET: 600 mL        PHYSICAL EXAM:  HEAD:  Atraumatic, Normocephalic  NECK: Supple, No   JVD  CHEST/LUNG:   no     rales,     no,    rhonchi  HEART: Regular rate and rhythm;         murmur  ABDOMEN: Soft, Nontender, ;   EXTREMITIES:     no   edema  NEUROLOGY:  alert    LABS:                        13.0   6.81  )-----------( 125      ( 19 Jan 2022 07:20 )             39.9     01-19    141  |  108  |  22  ----------------------------<  94  3.7   |  21<L>  |  1.14    Ca    8.9      19 Jan 2022 07:20    TPro  6.0  /  Alb  3.2<L>  /  TBili  0.8  /  DBili  x   /  AST  62<H>  /  ALT  35  /  AlkPhos  104  01-19                    Thyroid Stimulating Hormone, Serum: 0.88 uIU/mL (01-18 @ 08:50)          Consultant(s) Notes Reviewed:      Care Discussed with Consultants/Other Providers:    
           CARDIOLOGY     PROGRESS  NOTE   ________________________________________________    CHIEF COMPLAINT:Patient is a 88y old  Male who presents with a chief complaint of dizziness/ fall (18 Jan 2022 07:24)  no comoplain.  	  REVIEW OF SYSTEMS:  CONSTITUTIONAL: No fever, weight loss, or fatigue  EYES: No eye pain, visual disturbances, or discharge  ENT:  No difficulty hearing, tinnitus, vertigo; No sinus or throat pain  NECK: No pain or stiffness  RESPIRATORY: No cough, wheezing, chills or hemoptysis; No Shortness of Breath  CARDIOVASCULAR: No chest pain, palpitations, passing out, dizziness, or leg swelling  GASTROINTESTINAL: No abdominal or epigastric pain. No nausea, vomiting, or hematemesis; No diarrhea or constipation. No melena or hematochezia.  GENITOURINARY: No dysuria, frequency, hematuria, or incontinence  NEUROLOGICAL: No headaches, memory loss, loss of strength, numbness, or tremors  SKIN: No itching, burning, rashes, or lesions   LYMPH Nodes: No enlarged glands  ENDOCRINE: No heat or cold intolerance; No hair loss  MUSCULOSKELETAL: No joint pain or swelling; No muscle, back, or extremity pain  PSYCHIATRIC: No depression, anxiety, mood swings, or difficulty sleeping  HEME/LYMPH: No easy bruising, or bleeding gums  ALLERGY AND IMMUNOLOGIC: No hives or eczema	    [ ] All others negative	  [ ] Unable to obtain    PHYSICAL EXAM:  T(C): 36.2 (01-18-22 @ 05:52), Max: 38 (01-17-22 @ 18:12)  HR: 67 (01-18-22 @ 05:52) (63 - 88)  BP: 111/61 (01-18-22 @ 05:52) (108/63 - 208/92)  RR: 18 (01-18-22 @ 05:52) (18 - 20)  SpO2: 98% (01-18-22 @ 05:52) (95% - 99%)  Wt(kg): --  I&O's Summary      Appearance: Normal	  HEENT:   Normal oral mucosa, PERRL, EOMI	  Lymphatic: No lymphadenopathy  Cardiovascular: Normal S1 S2, No JVD, +murmurs, No edema  Respiratory: Lungs clear to auscultation	  Psychiatry: A & O x 3, Mood & affect appropriate  Gastrointestinal:  Soft, Non-tender, + BS	  Skin: No rashes, No ecchymoses, No cyanosis	  Neurologic: Non-focal  Extremities: Normal range of motion, No clubbing, cyanosis or edema  Vascular: Peripheral pulses palpable 2+ bilaterally    MEDICATIONS  (STANDING):  aspirin enteric coated 81 milliGRAM(s) Oral daily  cefTRIAXone   IVPB 1000 milliGRAM(s) IV Intermittent every 24 hours  heparin   Injectable 5000 Unit(s) SubCutaneous every 12 hours  lactated ringers. 1000 milliLiter(s) (80 mL/Hr) IV Continuous <Continuous>      TELEMETRY: 	    ECG:  	  RADIOLOGY:  OTHER: 	  	  LABS:	 	    CARDIAC MARKERS:                                12.9   8.28  )-----------( 106      ( 18 Jan 2022 06:19 )             40.1     01-18    136  |  105  |  20  ----------------------------<  86  3.8   |  19<L>  |  0.93    Ca    8.8      18 Jan 2022 06:19    TPro  5.9<L>  /  Alb  3.2<L>  /  TBili  1.9<H>  /  DBili  0.4<H>  /  AST  59<H>  /  ALT  43  /  AlkPhos  110  01-18    proBNP:   Lipid Profile:   HgA1c:   TSH:         Assessment and plan  ---------------------------  89yo M with PMHx of HTN, HLD pt has hx HLD, macular degen (monthly injections), stated ,unsteadiness over the past day, 2 falls with no headstrike or LOC, no recent fevers/chills, uri sxs, cough, abd pain  presenting with dizziness. He endorses that since yesterday at roughly 3pm he has been feeling unsteady on his feet and as if he will fall when he stands.    Denies any room spinning sensations or recent illnesses such as fevers, cough, rashes, etc  . Fallen earlier today on the street and hit his left side  . No difficulty ambulating afterwards and no head trauma. No blood thinner use. pt with sig cardiac risk factor for CAD, with dizziness.  ct angio head was noted recommending ?brain MRI  check orthostatic  asa  daily  physical therapy  temp , +UA ?uti start on abx  gentle hydration  echo  dvt prophylaxis    	        
  afberile    REVIEW OF SYSTEMS:  GEN: no fever,    no chills  RESP: no SOB,   no cough  CVS: no chest pain,   no palpitations  GI: no abdominal pain,   no nausea,   no vomiting,   no constipation,   no diarrhea  : no dysuria,   no frequency  NEURO: no headache,   no dizziness  PSYCH: no depression,   not anxious  Derm : no rash    MEDICATIONS  (STANDING):  aspirin enteric coated 81 milliGRAM(s) Oral daily  cefTRIAXone   IVPB 1000 milliGRAM(s) IV Intermittent every 24 hours  heparin   Injectable 5000 Unit(s) SubCutaneous every 12 hours    MEDICATIONS  (PRN):      Vital Signs Last 24 Hrs  T(C): 36.6 (2022 05:57), Max: 37.2 (2022 17:52)  T(F): 97.9 (2022 05:57), Max: 99 (2022 17:52)  HR: 67 (2022 05:57) (55 - 83)  BP: 125/67 (2022 05:57) (125/67 - 156/82)  BP(mean): 101 (2022 18:30) (101 - 101)  RR: 18 (2022 05:57) (18 - 18)  SpO2: 96% (2022 05:57) (96% - 100%)  CAPILLARY BLOOD GLUCOSE        I&O's Summary    2022 07:01  -  2022 07:00  --------------------------------------------------------  IN: 235 mL / OUT: 0 mL / NET: 235 mL        PHYSICAL EXAM:  HEAD:  Atraumatic, Normocephalic  NECK: Supple, No   JVD  CHEST/LUNG:   no     rales,     no,    rhonchi  HEART: Regular rate and rhythm;         murmur  ABDOMEN: Soft, Nontender, ;   EXTREMITIES:   no     edema  NEUROLOGY:  alert    LABS:                        12.9   8.28  )-----------( 106      ( 2022 06:19 )             40.1         136  |  105  |  20  ----------------------------<  86  3.8   |  19<L>  |  0.93    Ca    8.8      2022 06:19    TPro  5.9<L>  /  Alb  3.2<L>  /  TBili  1.9<H>  /  DBili  0.4<H>  /  AST  59<H>  /  ALT  43  /  AlkPhos  110            Urinalysis Basic - ( 2022 17:50 )    Color: Light Yellow / Appearance: Slightly Turbid / S.050 / pH: x  Gluc: x / Ketone: Negative  / Bili: Negative / Urobili: Negative   Blood: x / Protein: 100 / Nitrite: Negative   Leuk Esterase: Large / RBC: >50 /hpf / WBC >50 /HPF   Sq Epi: x / Non Sq Epi: 0 /hpf / Bacteria: Moderate              Thyroid Stimulating Hormone, Serum: 0.88 uIU/mL ( @ 08:50)          Consultant(s) Notes Reviewed:      Care Discussed with Consultants/Other Providers:    
  afberile    REVIEW OF SYSTEMS:  GEN: no fever,    no chills  RESP: no SOB,   no cough  CVS: no chest pain,   no palpitations  GI: no abdominal pain,   no nausea,   no vomiting,   no constipation,   no diarrhea  : no dysuria,   no frequency  NEURO: no headache,   no dizziness  PSYCH: no depression,   not anxious  Derm : no rash    MEDICATIONS  (STANDING):  aspirin enteric coated 81 milliGRAM(s) Oral daily  cefTRIAXone   IVPB 1000 milliGRAM(s) IV Intermittent every 24 hours  heparin   Injectable 5000 Unit(s) SubCutaneous every 12 hours  lactated ringers. 1000 milliLiter(s) (80 mL/Hr) IV Continuous <Continuous>    MEDICATIONS  (PRN):      Vital Signs Last 24 Hrs  T(C): 36.2 (2022 05:52), Max: 38 (2022 18:12)  T(F): 97.2 (2022 05:52), Max: 100.4 (2022 18:12)  HR: 67 (2022 05:52) (63 - 88)  BP: 111/61 (2022 05:52) (108/63 - 208/92)  BP(mean): --  RR: 18 (2022 05:52) (18 - 20)  SpO2: 98% (2022 05:52) (95% - 99%)  CAPILLARY BLOOD GLUCOSE  108 (2022 14:28)      POCT Blood Glucose.: 108 mg/dL (2022 13:37)    I&O's Summary      PHYSICAL EXAM:  HEAD:  Atraumatic, Normocephalic  NECK: Supple, No   JVD  CHEST/LUNG:   no     rales,     no,    rhonchi  HEART: Regular rate and rhythm;         murmur  ABDOMEN: Soft, Nontender, ;   EXTREMITIES:    no    edema  NEUROLOGY:  alert    LABS:                        12.9   8.28  )-----------( 106      ( 2022 06:19 )             40.1     18    136  |  105  |  20  ----------------------------<  86  3.8   |  19<L>  |  0.93    Ca    8.8      2022 06:19    TPro  5.9<L>  /  Alb  3.2<L>  /  TBili  1.9<H>  /  DBili  0.4<H>  /  AST  59<H>  /  ALT  43  /  AlkPhos  110            Urinalysis Basic - ( 2022 17:50 )    Color: Light Yellow / Appearance: Slightly Turbid / S.050 / pH: x  Gluc: x / Ketone: Negative  / Bili: Negative / Urobili: Negative   Blood: x / Protein: 100 / Nitrite: Negative   Leuk Esterase: Large / RBC: >50 /hpf / WBC >50 /HPF   Sq Epi: x / Non Sq Epi: 0 /hpf / Bacteria: Moderate                      Consultant(s) Notes Reviewed:      Care Discussed with Consultants/Other Providers:    
87yo M with PMHx of HTN, HLD, macular degen (monthly injections), admitted 1/17/22 withunsteadiness 1 day PTA day, 2 falls with no headstrike or LOC. Denied any room spinning sensations or recent illnesses such as fevers, cough, rashes, etc  Fall 1/17/22 on the street and hit his left side. No difficulty ambulating afterwards and no head trauma. No blood thinner use. Daughter Mary [phone],   PCP: Dr. Ole Ferrer  PAST MEDICAL & SURGICAL HISTORY:  HTN (hypertension)    HLD (hyperlipidemia)      Medications:  aspirin enteric coated 81 milliGRAM(s) Oral daily  cefTRIAXone   IVPB 1000 milliGRAM(s) IV Intermittent every 24 hours  heparin   Injectable 5000 Unit(s) SubCutaneous every 12 hours    Labs:  CBC Full  -  ( 19 Jan 2022 07:20 )  WBC Count : 6.81 K/uL  Hemoglobin : 13.0 g/dL  Hematocrit : 39.9 %  Platelet Count - Automated : 125 K/uL  Mean Cell Volume : 87.5 fl  Mean Cell Hemoglobin : 28.5 pg  Mean Cell Hemoglobin Concentration : 32.6 gm/dL  Auto Neutrophil # : 4.19 K/uL  Auto Lymphocyte # : 1.25 K/uL  Auto Monocyte # : 1.00 K/uL  Auto Eosinophil # : 0.32 K/uL  Auto Basophil # : 0.02 K/uL  Auto Neutrophil % : 61.5 %  Auto Lymphocyte % : 18.4 %  Auto Monocyte % : 14.7 %  Auto Eosinophil % : 4.7 %  Auto Basophil % : 0.3 %  Platelet Count - Automated: 125 K/uL (01.19.22 @ 07:20)   Platelet Count - Automated: 106 K/uL (01.18.22 @ 06:19)   Platelet Count - Automated: 124 K/uL (01.17.22 @ 14:08)   Platelet Count - Automated: 165 K/uL (04.04.20 @ 21:11)   01-19    141  |  108  |  22  ----------------------------<  94  3.7   |  21<L>  |  1.14    Ca    8.9      19 Jan 2022 07:20    TPro  6.0  /  Alb  3.2<L>  /  TBili  0.8  /  DBili  x   /  AST  62<H>  /  ALT  35  /  AlkPhos  104  01-19      Radiology:             ROS:  Patient comfortable without distress  No SOB or chest pain  No palpitation  No abdominal pain, diarrhaea or constipation  No weakness of extremities  No skin changes or swelling of legs    Vital Signs Last 24 Hrs  T(C): 36.6 (19 Jan 2022 05:57), Max: 37.2 (18 Jan 2022 17:52)  T(F): 97.9 (19 Jan 2022 05:57), Max: 99 (18 Jan 2022 17:52)  HR: 67 (19 Jan 2022 05:57) (55 - 83)  BP: 125/67 (19 Jan 2022 05:57) (125/67 - 156/82)  BP(mean): 101 (18 Jan 2022 18:30) (101 - 101)  RR: 18 (19 Jan 2022 05:57) (18 - 18)  SpO2: 96% (19 Jan 2022 05:57) (96% - 100%)    Physical exam:    No distress  CVS: S1, S2   Chest: bilateral breath sound without rales  Abdomen: soft, not tender, no organomegaly or masses  No focal neuro deficit  No edema      Assessment and Plan:Height (cm): 167.6 (01-18 @ 20:12)  Weight (kg): 61 (01-18 @ 20:12)  BMI (kg/m2): 21.7 (01-18 @ 20:12)  BSA (m2): 1.69 (01-18 @ 20:12)
LEONCIO ANDREW 88y [id number]    Patient is a 88y old  Male who presents with a chief complaint of dizziness/ fall (20 Jan 2022 11:25)      Follow Up/CC:  ID following for UTI    Interval History/ROS: feels well, no fever, wants to go home     Allergies    No Known Allergies    Intolerances        ANTIMICROBIALS:  cefpodoxime 100 every 12 hours      MEDICATIONS  (STANDING):  aspirin enteric coated 81 milliGRAM(s) Oral daily  cefpodoxime 100 milliGRAM(s) Oral every 12 hours  heparin   Injectable 5000 Unit(s) SubCutaneous every 12 hours    MEDICATIONS  (PRN):        Vital Signs Last 24 Hrs  T(C): 36.6 (20 Jan 2022 14:17), Max: 36.8 (19 Jan 2022 20:40)  T(F): 97.8 (20 Jan 2022 14:17), Max: 98.3 (19 Jan 2022 20:40)  HR: 66 (20 Jan 2022 14:17) (62 - 72)  BP: 147/75 (20 Jan 2022 14:17) (132/65 - 147/75)  BP(mean): --  RR: 18 (20 Jan 2022 14:17) (18 - 18)  SpO2: 97% (20 Jan 2022 14:17) (97% - 98%)    CBC Full  -  ( 20 Jan 2022 06:52 )  WBC Count : 5.14 K/uL  RBC Count : 4.58 M/uL  Hemoglobin : 12.9 g/dL  Hematocrit : 40.2 %  Platelet Count - Automated : 147 K/uL  Mean Cell Volume : 87.8 fl  Mean Cell Hemoglobin : 28.2 pg  Mean Cell Hemoglobin Concentration : 32.1 gm/dL  Auto Neutrophil # : 2.92 K/uL  Auto Lymphocyte # : 1.15 K/uL  Auto Monocyte # : 0.72 K/uL  Auto Eosinophil # : 0.31 K/uL  Auto Basophil # : 0.02 K/uL  Auto Neutrophil % : 56.8 %  Auto Lymphocyte % : 22.4 %  Auto Monocyte % : 14.0 %  Auto Eosinophil % : 6.0 %  Auto Basophil % : 0.4 %    01-20    139  |  107  |  23  ----------------------------<  84  3.9   |  19<L>  |  0.99    Ca    9.0      20 Jan 2022 06:52    TPro  6.2  /  Alb  3.4  /  TBili  0.7  /  DBili  x   /  AST  66<H>  /  ALT  39  /  AlkPhos  115  01-20    LIVER FUNCTIONS - ( 20 Jan 2022 06:52 )  Alb: 3.4 g/dL / Pro: 6.2 g/dL / ALK PHOS: 115 U/L / ALT: 39 U/L / AST: 66 U/L / GGT: x               MICROBIOLOGY:  .Blood Blood-Venous  01-17-22   No growth to date.  --  --      Clean Catch Clean Catch (Midstream)  01-17-22   >100,000 CFU/ml Klebsiella pneumoniae  --  Klebsiella pneumoniae              v            RADIOLOGY

## 2022-01-20 NOTE — PROGRESS NOTE ADULT - ASSESSMENT
87yo M with PMHx of HTN, HLD, macular degen (monthly injections), stated ,unsteadiness over the past day, 2 falls with no headstrike or LOC with fever, possible UTI     Stuart Greene  Attending Physician   Division of Infectious Disease  Pager #[phone]  Available on [employer] Teams also  After 5pm/weekend or no response, call #[phone]

## 2022-01-20 NOTE — DISCHARGE NOTE PROVIDER - NSDCFUADDAPPT_GEN_ALL_CORE_FT
You must follow up with your primary medical doctor within 3-4 days of discharge - please call to make an appointment. You must follow up with your primary medical doctor within 3-4 days of discharge - please call to make an appointment.        APPTS ARE READY TO BE MADE: [x ] YES    Best Family or Patient Contact (if needed):    Additional Information about above appointments (if needed):    1:   2:   3:     Other comments or requests:    You must follow up with your primary medical doctor within 3-4 days of discharge - please call to make an appointment.        APPTS ARE READY TO BE MADE: [x ] YES    Best Family or Patient Contact (if needed):    Additional Information about above appointments (if needed):    1: Patient was scheduled with Dr. KAY YOU on (1/25/22) (3:30PM) at ([id number] [facility]) through the provider's office. Patient advised of appointment details. PROVIDER IS AT THE SAME PRACTICE AS DR. LUCA SANTAMARIA.  2:   3:     Other comments or requests:

## 2022-01-22 LAB
CULTURE RESULTS: SIGNIFICANT CHANGE UP
CULTURE RESULTS: SIGNIFICANT CHANGE UP
SPECIMEN SOURCE: SIGNIFICANT CHANGE UP
SPECIMEN SOURCE: SIGNIFICANT CHANGE UP

## 2022-01-23 LAB — METHYLMALONATE SERPL-SCNC: 551 NMOL/L — HIGH (ref 0–378)

## 2022-01-23 NOTE — CHART NOTE - NSCHARTNOTEFT_GEN_A_CORE
Tentatively has an appt scheduled with Dr. Ferrer for Tue 1/25 but doesn't have a time yet. He is calling them tomorrow morning and agreed to receive a call back in the afternoon around 1-2pm to confirm the appt.
Request from Dr. Cooney to facilitate patient discharge.  Medication reconciliation reviewed, revised, and resolved with Dr. Cooney, who has medically cleared patient for discharge with follow up as advised.  Please refer to discharge note, completed by Dr. Cooney, for detailed hospital course.

## 2023-03-22 ENCOUNTER — NON-APPOINTMENT (OUTPATIENT)
Age: 88
End: 2023-03-22

## 2023-03-23 PROBLEM — E78.5 HYPERLIPIDEMIA, UNSPECIFIED: Chronic | Status: ACTIVE | Noted: 2022-01-17

## 2023-03-23 PROBLEM — I10 ESSENTIAL (PRIMARY) HYPERTENSION: Chronic | Status: ACTIVE | Noted: 2022-01-17

## 2023-04-19 ENCOUNTER — APPOINTMENT (OUTPATIENT)
Dept: GASTROENTEROLOGY | Facility: CLINIC | Age: 88
End: 2023-04-19